# Patient Record
Sex: FEMALE | Race: WHITE | NOT HISPANIC OR LATINO
[De-identification: names, ages, dates, MRNs, and addresses within clinical notes are randomized per-mention and may not be internally consistent; named-entity substitution may affect disease eponyms.]

---

## 2019-12-31 ENCOUNTER — TRANSCRIPTION ENCOUNTER (OUTPATIENT)
Age: 84
End: 2019-12-31

## 2019-12-31 ENCOUNTER — INPATIENT (INPATIENT)
Facility: HOSPITAL | Age: 84
LOS: 2 days | Discharge: NURSING, REHAB OR LONG-TERM CARE | DRG: 481 | End: 2020-01-03
Attending: ORTHOPAEDIC SURGERY | Admitting: ORTHOPAEDIC SURGERY
Payer: MEDICARE

## 2019-12-31 VITALS
HEART RATE: 98 BPM | DIASTOLIC BLOOD PRESSURE: 74 MMHG | SYSTOLIC BLOOD PRESSURE: 164 MMHG | RESPIRATION RATE: 18 BRPM | OXYGEN SATURATION: 96 % | WEIGHT: 145.95 LBS | TEMPERATURE: 99 F

## 2019-12-31 DIAGNOSIS — Z98.49 CATARACT EXTRACTION STATUS, UNSPECIFIED EYE: Chronic | ICD-10-CM

## 2019-12-31 DIAGNOSIS — S72.001A FRACTURE OF UNSPECIFIED PART OF NECK OF RIGHT FEMUR, INITIAL ENCOUNTER FOR CLOSED FRACTURE: ICD-10-CM

## 2019-12-31 LAB
ALBUMIN SERPL ELPH-MCNC: 4.1 G/DL — SIGNIFICANT CHANGE UP (ref 3.5–5)
ALP SERPL-CCNC: 98 U/L — SIGNIFICANT CHANGE UP (ref 40–120)
ALT FLD-CCNC: 30 U/L DA — SIGNIFICANT CHANGE UP (ref 10–60)
ANION GAP SERPL CALC-SCNC: 7 MMOL/L — SIGNIFICANT CHANGE UP (ref 5–17)
APTT BLD: 28.4 SEC — SIGNIFICANT CHANGE UP (ref 27.5–36.3)
AST SERPL-CCNC: 34 U/L — SIGNIFICANT CHANGE UP (ref 10–40)
BASOPHILS # BLD AUTO: 0 K/UL — SIGNIFICANT CHANGE UP (ref 0–0.2)
BASOPHILS NFR BLD AUTO: 0 % — SIGNIFICANT CHANGE UP (ref 0–2)
BILIRUB SERPL-MCNC: 0.6 MG/DL — SIGNIFICANT CHANGE UP (ref 0.2–1.2)
BUN SERPL-MCNC: 29 MG/DL — HIGH (ref 7–18)
CALCIUM SERPL-MCNC: 8.8 MG/DL — SIGNIFICANT CHANGE UP (ref 8.4–10.5)
CHLORIDE SERPL-SCNC: 107 MMOL/L — SIGNIFICANT CHANGE UP (ref 96–108)
CO2 SERPL-SCNC: 25 MMOL/L — SIGNIFICANT CHANGE UP (ref 22–31)
CREAT SERPL-MCNC: 1.06 MG/DL — SIGNIFICANT CHANGE UP (ref 0.5–1.3)
EOSINOPHIL # BLD AUTO: 0 K/UL — SIGNIFICANT CHANGE UP (ref 0–0.5)
EOSINOPHIL NFR BLD AUTO: 0 % — SIGNIFICANT CHANGE UP (ref 0–6)
GLUCOSE SERPL-MCNC: 104 MG/DL — HIGH (ref 70–99)
HCT VFR BLD CALC: 36 % — SIGNIFICANT CHANGE UP (ref 34.5–45)
HGB BLD-MCNC: 11.3 G/DL — LOW (ref 11.5–15.5)
INR BLD: 1.03 RATIO — SIGNIFICANT CHANGE UP (ref 0.88–1.16)
LYMPHOCYTES # BLD AUTO: 1.06 K/UL — SIGNIFICANT CHANGE UP (ref 1–3.3)
LYMPHOCYTES # BLD AUTO: 8 % — LOW (ref 13–44)
MCHC RBC-ENTMCNC: 21.4 PG — LOW (ref 27–34)
MCHC RBC-ENTMCNC: 31.4 GM/DL — LOW (ref 32–36)
MCV RBC AUTO: 68.3 FL — LOW (ref 80–100)
MONOCYTES # BLD AUTO: 0.53 K/UL — SIGNIFICANT CHANGE UP (ref 0–0.9)
MONOCYTES NFR BLD AUTO: 4 % — SIGNIFICANT CHANGE UP (ref 2–14)
NEUTROPHILS # BLD AUTO: 11.62 K/UL — HIGH (ref 1.8–7.4)
NEUTROPHILS NFR BLD AUTO: 88 % — HIGH (ref 43–77)
PLATELET # BLD AUTO: 160 K/UL — SIGNIFICANT CHANGE UP (ref 150–400)
POTASSIUM SERPL-MCNC: 4.7 MMOL/L — SIGNIFICANT CHANGE UP (ref 3.5–5.3)
POTASSIUM SERPL-SCNC: 4.7 MMOL/L — SIGNIFICANT CHANGE UP (ref 3.5–5.3)
PROT SERPL-MCNC: 7.1 G/DL — SIGNIFICANT CHANGE UP (ref 6–8.3)
PROTHROM AB SERPL-ACNC: 11.5 SEC — SIGNIFICANT CHANGE UP (ref 10–12.9)
RBC # BLD: 5.27 M/UL — HIGH (ref 3.8–5.2)
RBC # FLD: 17.4 % — HIGH (ref 10.3–14.5)
SODIUM SERPL-SCNC: 139 MMOL/L — SIGNIFICANT CHANGE UP (ref 135–145)
WBC # BLD: 13.2 K/UL — HIGH (ref 3.8–10.5)
WBC # FLD AUTO: 13.2 K/UL — HIGH (ref 3.8–10.5)

## 2019-12-31 PROCEDURE — 99222 1ST HOSP IP/OBS MODERATE 55: CPT

## 2019-12-31 PROCEDURE — 73502 X-RAY EXAM HIP UNI 2-3 VIEWS: CPT | Mod: 26,RT

## 2019-12-31 PROCEDURE — 99285 EMERGENCY DEPT VISIT HI MDM: CPT

## 2019-12-31 PROCEDURE — 71045 X-RAY EXAM CHEST 1 VIEW: CPT | Mod: 26

## 2019-12-31 PROCEDURE — 73552 X-RAY EXAM OF FEMUR 2/>: CPT | Mod: 26,RT

## 2019-12-31 RX ORDER — MORPHINE SULFATE 50 MG/1
2 CAPSULE, EXTENDED RELEASE ORAL ONCE
Refills: 0 | Status: DISCONTINUED | OUTPATIENT
Start: 2019-12-31 | End: 2019-12-31

## 2019-12-31 RX ORDER — ACETAMINOPHEN 500 MG
1000 TABLET ORAL ONCE
Refills: 0 | Status: COMPLETED | OUTPATIENT
Start: 2019-12-31 | End: 2020-01-01

## 2019-12-31 RX ORDER — HYDROMORPHONE HYDROCHLORIDE 2 MG/ML
0.5 INJECTION INTRAMUSCULAR; INTRAVENOUS; SUBCUTANEOUS ONCE
Refills: 0 | Status: DISCONTINUED | OUTPATIENT
Start: 2019-12-31 | End: 2019-12-31

## 2019-12-31 RX ORDER — MORPHINE SULFATE 50 MG/1
2 CAPSULE, EXTENDED RELEASE ORAL EVERY 4 HOURS
Refills: 0 | Status: DISCONTINUED | OUTPATIENT
Start: 2019-12-31 | End: 2020-01-01

## 2019-12-31 RX ORDER — SODIUM CHLORIDE 9 MG/ML
1000 INJECTION INTRAMUSCULAR; INTRAVENOUS; SUBCUTANEOUS ONCE
Refills: 0 | Status: COMPLETED | OUTPATIENT
Start: 2019-12-31 | End: 2019-12-31

## 2019-12-31 RX ORDER — HEPARIN SODIUM 5000 [USP'U]/ML
5000 INJECTION INTRAVENOUS; SUBCUTANEOUS EVERY 8 HOURS
Refills: 0 | Status: DISCONTINUED | OUTPATIENT
Start: 2019-12-31 | End: 2020-01-01

## 2019-12-31 RX ORDER — SODIUM CHLORIDE 9 MG/ML
1000 INJECTION, SOLUTION INTRAVENOUS
Refills: 0 | Status: DISCONTINUED | OUTPATIENT
Start: 2019-12-31 | End: 2020-01-01

## 2019-12-31 RX ORDER — ONDANSETRON 8 MG/1
4 TABLET, FILM COATED ORAL ONCE
Refills: 0 | Status: COMPLETED | OUTPATIENT
Start: 2019-12-31 | End: 2019-12-31

## 2019-12-31 RX ADMIN — SODIUM CHLORIDE 1000 MILLILITER(S): 9 INJECTION INTRAMUSCULAR; INTRAVENOUS; SUBCUTANEOUS at 19:38

## 2019-12-31 RX ADMIN — HYDROMORPHONE HYDROCHLORIDE 0.5 MILLIGRAM(S): 2 INJECTION INTRAMUSCULAR; INTRAVENOUS; SUBCUTANEOUS at 19:37

## 2019-12-31 RX ADMIN — MORPHINE SULFATE 2 MILLIGRAM(S): 50 CAPSULE, EXTENDED RELEASE ORAL at 22:09

## 2019-12-31 RX ADMIN — HYDROMORPHONE HYDROCHLORIDE 0.5 MILLIGRAM(S): 2 INJECTION INTRAMUSCULAR; INTRAVENOUS; SUBCUTANEOUS at 19:38

## 2019-12-31 RX ADMIN — ONDANSETRON 4 MILLIGRAM(S): 8 TABLET, FILM COATED ORAL at 22:17

## 2019-12-31 RX ADMIN — MORPHINE SULFATE 2 MILLIGRAM(S): 50 CAPSULE, EXTENDED RELEASE ORAL at 22:53

## 2019-12-31 NOTE — H&P ADULT - NSHPLABSRESULTS_GEN_ALL_CORE
11.3   13.20 )-----------( 160      ( 31 Dec 2019 19:35 )             36.0   12-31    139  |  107  |  29<H>  ----------------------------<  104<H>  4.7   |  25  |  1.06    Ca    8.8      31 Dec 2019 19:35    TPro  7.1  /  Alb  4.1  /  TBili  0.6  /  DBili  x   /  AST  34  /  ALT  30  /  AlkPhos  98  12-31      Hip XR /Rt femur XR (prelim)  Intertochanteric fracture of the Rt Hip

## 2019-12-31 NOTE — H&P ADULT - NSICDXPASTMEDICALHX_GEN_ALL_CORE_FT
PAST MEDICAL HISTORY:  CAD S/P percutaneous coronary angioplasty     H/O Raynaud's syndrome     HTN (hypertension)     Rheumatoid arthritis

## 2019-12-31 NOTE — H&P ADULT - HISTORY OF PRESENT ILLNESS
88 y.o female with PMHX of HTN, CAD (PCI x1 stent, no blood thinners) , RA and Raynaud's disease and PSHx of Cataract surgery  presents to the ED with a Rt hip pain s/p mechanical fall today. Pt was standing on a stool in her house,fell of and landed on R hip and unable to walk. Patient denies any head injury, LOC, use of blood thinners, neck pain, chest pain , nausea, vomiting, weakness or paralysis or any other acute complaints.

## 2019-12-31 NOTE — CONSULT NOTE ADULT - ASSESSMENT
88 y.o female with PMHX of HTN, CAD (PCI x1 stent, no blood thinners) , RA and Raynaud's disease and PSHx of Cataract surgery  presents to the ED with a Rt hip pain s/p mechanical fall today. Medicine is consulted for preoperative clearance.    Pre operative clearance for Hip surgery:  Patient is fully independent in activities of daily living with METS >4, She has no personal or family hx of anesthesia complication, is non smoker and non ETOH abuse. No symptoms of cardiac decompensation. Her EKG shows NSR, No ST-T wave changes.   She is at low to moderate risk for getting surgery.    Hypoxia:  Patient was hypoxic to 85% in ED on room air. Patient was given 2LNC and O2 sat increase t0 89 -91%. Seems like artifact as patient is not in distress. F.u cardiac enzymes.     Discussed with attending.

## 2019-12-31 NOTE — H&P ADULT - ASSESSMENT
88 y.o female with PMHX of HTN, CAD (PCI x1 stent, no blood thinners) , RA and Raynaud's disease and PSHx of Cataract surgery  presents to the ED with a Rt hip pain s/p mechanical fall today Patient denies any head injury, LOC, use of blood thinners. Pt has a possible Rt intertrochanteric fracture of Hip . Admitted to Ortho for possible Surgical intervention    Admit to [facility] under Dr Gunter for possible Orthopedic Surgical intervention  Keep Preop mode  NPO  IVF on NPO  Pain control  DVT PPx

## 2019-12-31 NOTE — H&P ADULT - NSHPPHYSICALEXAM_GEN_ALL_CORE
T(C): 36.6 (12-31-19 @ 19:24), Max: 37.2 (12-31-19 @ 18:34)  HR: 89 (12-31-19 @ 19:24) (89 - 98)  BP: 167/69 (12-31-19 @ 19:24) (164/74 - 167/69)  RR: 18 (12-31-19 @ 19:24) (18 - 18)  SpO2: 92% (12-31-19 @ 19:24) (92% - 96%)  Wt(kg): --Vital Signs Last 24 Hrs  T(C): 36.6 (31 Dec 2019 19:24), Max: 37.2 (31 Dec 2019 18:34)  T(F): 97.9 (31 Dec 2019 19:24), Max: 98.9 (31 Dec 2019 18:34)  HR: 89 (31 Dec 2019 19:24) (89 - 98)  BP: 167/69 (31 Dec 2019 19:24) (164/74 - 167/69)  BP(mean): --  RR: 18 (31 Dec 2019 19:24) (18 - 18)  SpO2: 92% (31 Dec 2019 19:24) (92% - 96%)    PHYSICAL EXAM:  GENERAL: elderly female in NAD, well-groomed, well-developed  HEAD:  Atraumatic, Normocephalic  NECK: Supple, No JVD, Normal thyroid  CHEST/LUNG: Clear to percussion bilaterally; No rales, rhonchi, wheezing, or rubs  HEART: Regular rate and rhythm; No murmurs, rubs, or gallops  ABDOMEN: Soft, Nontender, Nondistended; Bowel sounds present  EXTREMITIES:  Rt hip tenderness, PROM, no ecchymoses or open wound, DP/PT pulses are palpable No clubbing, cyanosis, or edema  SKIN: No rashes or lesions or open wound noted

## 2019-12-31 NOTE — CONSULT NOTE ADULT - ATTENDING COMMENTS
Vital Signs Last 24 Hrs  T(C): 36.6 (31 Dec 2019 19:24), Max: 37.2 (31 Dec 2019 18:34)  T(F): 97.9 (31 Dec 2019 19:24), Max: 98.9 (31 Dec 2019 18:34)  HR: 89 (31 Dec 2019 19:24) (89 - 98)  BP: 167/69 (31 Dec 2019 19:24) (164/74 - 167/69)  BP(mean): --  RR: 18 (31 Dec 2019 19:24) (18 - 18)  SpO2: 92% (31 Dec 2019 19:24) (92% - 96%) Pt seen and examined. Case discussed with Housestaff.  Consult for pre-op evaluation and optimization.  88 year old woman with hx of HTN, CAD s/p PCI ( 1 stent) in 2015 @ [facility]- both well controlled on medications with regular follow up with her physicians. She is admitted to the Orthopedic service on account of mechanical fall on her right hip with a right hip fracture.  She is very active - independent with her ADLS; able to grocery shop and cook. Keeps a garden and walks multiple blocks with no limitations.   No recent chest pain; NO hx of DM2 /CVA / CKD or CHF.    Vital Signs Last 24 Hrs  T(C): 36.6 (31 Dec 2019 19:24), Max: 37.2 (31 Dec 2019 18:34)  T(F): 97.9 (31 Dec 2019 19:24), Max: 98.9 (31 Dec 2019 18:34)  HR: 89 (31 Dec 2019 19:24) (89 - 98)  BP: 167/69 (31 Dec 2019 19:24) (164/74 - 167/69)  RR: 18 (31 Dec 2019 19:24) (18 - 18)  SpO2: 92% (31 Dec 2019 19:24) (92% - 96%)    Elderly woman, in painful distress, AAO X 3  CTA B/L; RRR S1S2 only  Soft NT ND BS +  Right LE is shortened and externally rotated.  No pedal edema  No focal deficits    Labs                         11.3   13.20 )-----------( 160      ( 31 Dec 2019 19:35 )             36.0     12-31    139  |  107  |  29<H>  ----------------------------<  104<H>  4.7   |  25  |  1.06    Ca    8.8      31 Dec 2019 19:35    TPro  7.1  /  Alb  4.1  /  TBili  0.6  /  DBili  x   /  AST  34  /  ALT  30  /  AlkPhos  98  12-31    Pelvic x ray / R femur X ray - slightly displaced impacted intertrochanteric fracture  CXR - no acute findings    Impression  88 year old woman with hx as above here post mechanical fall with right hip fracture as detailed above.  Pt with no active CAD - off Plavix recently, HTN well controlled with METS approximated above 4.  She has a low to medium risk for a cardiac event while undergoing this intermediate risk no cardiac procedure.  No further evaluation required.  Continue BP meds  Hold ASA  Pain control per primary  Anticoagulation per primary

## 2019-12-31 NOTE — H&P ADULT - ATTENDING COMMENTS
pt seen and evaluated.  displaced r it fx with subtroch extention.  rec IM nailing of r IT / subtroch fx.  will need medical clearance / optimization.  explained risks / benefits alternativest to pt and family, including but not limited to heart attack stroke, death, failure of fixation, fracture, blood clot, nerve damage, bleeding, infection , and leg lngth discrepancy.   pt and family understand and wish to proceed.

## 2019-12-31 NOTE — ED ADULT NURSE NOTE - NSIMPLEMENTINTERV_GEN_ALL_ED
Implemented All Fall with Harm Risk Interventions:  [place] to call system. Call bell, personal items and telephone within reach. Instruct patient to call for assistance. Room bathroom lighting operational. Non-slip footwear when patient is off stretcher. Physically safe environment: no spills, clutter or unnecessary equipment. Stretcher in lowest position, wheels locked, appropriate side rails in place. Provide visual cue, wrist band, yellow gown, etc. Monitor gait and stability. Monitor for mental status changes and reorient to person, place, and time. Review medications for side effects contributing to fall risk. Reinforce activity limits and safety measures with patient and family. Provide visual clues: red socks.

## 2019-12-31 NOTE — ED ADULT TRIAGE NOTE - CHIEF COMPLAINT QUOTE
as per the  ems pt fell hit back  patient c/o rt hip pain as per the  ems pt fell hit back  patient c/o rt hip pain . not on blood thinners

## 2020-01-01 LAB
ABO RH CONFIRMATION: SIGNIFICANT CHANGE UP
ANION GAP SERPL CALC-SCNC: 5 MMOL/L — SIGNIFICANT CHANGE UP (ref 5–17)
BASOPHILS # BLD AUTO: 0.02 K/UL — SIGNIFICANT CHANGE UP (ref 0–0.2)
BASOPHILS NFR BLD AUTO: 0.2 % — SIGNIFICANT CHANGE UP (ref 0–2)
BUN SERPL-MCNC: 25 MG/DL — HIGH (ref 7–18)
CALCIUM SERPL-MCNC: 8.2 MG/DL — LOW (ref 8.4–10.5)
CHLORIDE SERPL-SCNC: 108 MMOL/L — SIGNIFICANT CHANGE UP (ref 96–108)
CO2 SERPL-SCNC: 27 MMOL/L — SIGNIFICANT CHANGE UP (ref 22–31)
CREAT SERPL-MCNC: 0.74 MG/DL — SIGNIFICANT CHANGE UP (ref 0.5–1.3)
EOSINOPHIL # BLD AUTO: 0.01 K/UL — SIGNIFICANT CHANGE UP (ref 0–0.5)
EOSINOPHIL NFR BLD AUTO: 0.1 % — SIGNIFICANT CHANGE UP (ref 0–6)
GLUCOSE SERPL-MCNC: 118 MG/DL — HIGH (ref 70–99)
HCT VFR BLD CALC: 33.2 % — LOW (ref 34.5–45)
HGB BLD-MCNC: 10.1 G/DL — LOW (ref 11.5–15.5)
IMM GRANULOCYTES NFR BLD AUTO: 0.7 % — SIGNIFICANT CHANGE UP (ref 0–1.5)
LYMPHOCYTES # BLD AUTO: 1.08 K/UL — SIGNIFICANT CHANGE UP (ref 1–3.3)
LYMPHOCYTES # BLD AUTO: 10.7 % — LOW (ref 13–44)
MCHC RBC-ENTMCNC: 20.7 PG — LOW (ref 27–34)
MCHC RBC-ENTMCNC: 30.4 GM/DL — LOW (ref 32–36)
MCV RBC AUTO: 68.2 FL — LOW (ref 80–100)
MONOCYTES # BLD AUTO: 0.63 K/UL — SIGNIFICANT CHANGE UP (ref 0–0.9)
MONOCYTES NFR BLD AUTO: 6.3 % — SIGNIFICANT CHANGE UP (ref 2–14)
NEUTROPHILS # BLD AUTO: 8.26 K/UL — HIGH (ref 1.8–7.4)
NEUTROPHILS NFR BLD AUTO: 82 % — HIGH (ref 43–77)
NRBC # BLD: 0 /100 WBCS — SIGNIFICANT CHANGE UP (ref 0–0)
PLATELET # BLD AUTO: 140 K/UL — LOW (ref 150–400)
POTASSIUM SERPL-MCNC: 4.7 MMOL/L — SIGNIFICANT CHANGE UP (ref 3.5–5.3)
POTASSIUM SERPL-SCNC: 4.7 MMOL/L — SIGNIFICANT CHANGE UP (ref 3.5–5.3)
RBC # BLD: 4.87 M/UL — SIGNIFICANT CHANGE UP (ref 3.8–5.2)
RBC # FLD: 16.6 % — HIGH (ref 10.3–14.5)
SODIUM SERPL-SCNC: 140 MMOL/L — SIGNIFICANT CHANGE UP (ref 135–145)
WBC # BLD: 10.07 K/UL — SIGNIFICANT CHANGE UP (ref 3.8–10.5)
WBC # FLD AUTO: 10.07 K/UL — SIGNIFICANT CHANGE UP (ref 3.8–10.5)

## 2020-01-01 PROCEDURE — 77071 MNL APPL STRS JT RADIOGRAPHY: CPT | Mod: 26,RT

## 2020-01-01 PROCEDURE — 27245 TREAT THIGH FRACTURE: CPT | Mod: AS,RT,59

## 2020-01-01 PROCEDURE — 27170 REPAIR/GRAFT FEMUR HEAD/NECK: CPT | Mod: AS,59,RT

## 2020-01-01 PROCEDURE — 76000 FLUOROSCOPY <1 HR PHYS/QHP: CPT | Mod: 26

## 2020-01-01 RX ORDER — CHLORHEXIDINE GLUCONATE 213 G/1000ML
1 SOLUTION TOPICAL EVERY 12 HOURS
Refills: 0 | Status: DISCONTINUED | OUTPATIENT
Start: 2020-01-01 | End: 2020-01-01

## 2020-01-01 RX ORDER — ENOXAPARIN SODIUM 100 MG/ML
30 INJECTION SUBCUTANEOUS EVERY 12 HOURS
Refills: 0 | Status: DISCONTINUED | OUTPATIENT
Start: 2020-01-02 | End: 2020-01-03

## 2020-01-01 RX ORDER — HYDROMORPHONE HYDROCHLORIDE 2 MG/ML
1 INJECTION INTRAMUSCULAR; INTRAVENOUS; SUBCUTANEOUS
Refills: 0 | Status: DISCONTINUED | OUTPATIENT
Start: 2020-01-01 | End: 2020-01-01

## 2020-01-01 RX ORDER — ONDANSETRON 8 MG/1
4 TABLET, FILM COATED ORAL EVERY 6 HOURS
Refills: 0 | Status: DISCONTINUED | OUTPATIENT
Start: 2020-01-01 | End: 2020-01-03

## 2020-01-01 RX ORDER — SODIUM CHLORIDE 9 MG/ML
1000 INJECTION INTRAMUSCULAR; INTRAVENOUS; SUBCUTANEOUS
Refills: 0 | Status: DISCONTINUED | OUTPATIENT
Start: 2020-01-01 | End: 2020-01-03

## 2020-01-01 RX ORDER — MAGNESIUM HYDROXIDE 400 MG/1
30 TABLET, CHEWABLE ORAL DAILY
Refills: 0 | Status: DISCONTINUED | OUTPATIENT
Start: 2020-01-01 | End: 2020-01-03

## 2020-01-01 RX ORDER — OXYCODONE HYDROCHLORIDE 5 MG/1
2.5 TABLET ORAL EVERY 4 HOURS
Refills: 0 | Status: DISCONTINUED | OUTPATIENT
Start: 2020-01-01 | End: 2020-01-03

## 2020-01-01 RX ORDER — HYDROMORPHONE HYDROCHLORIDE 2 MG/ML
0.5 INJECTION INTRAMUSCULAR; INTRAVENOUS; SUBCUTANEOUS
Refills: 0 | Status: DISCONTINUED | OUTPATIENT
Start: 2020-01-01 | End: 2020-01-01

## 2020-01-01 RX ORDER — SENNA PLUS 8.6 MG/1
2 TABLET ORAL AT BEDTIME
Refills: 0 | Status: DISCONTINUED | OUTPATIENT
Start: 2020-01-01 | End: 2020-01-03

## 2020-01-01 RX ORDER — POLYETHYLENE GLYCOL 3350 17 G/17G
17 POWDER, FOR SOLUTION ORAL DAILY
Refills: 0 | Status: DISCONTINUED | OUTPATIENT
Start: 2020-01-01 | End: 2020-01-03

## 2020-01-01 RX ORDER — ACETAMINOPHEN 500 MG
1000 TABLET ORAL ONCE
Refills: 0 | Status: COMPLETED | OUTPATIENT
Start: 2020-01-01 | End: 2020-01-01

## 2020-01-01 RX ORDER — ACETAMINOPHEN 500 MG
975 TABLET ORAL EVERY 8 HOURS
Refills: 0 | Status: DISCONTINUED | OUTPATIENT
Start: 2020-01-01 | End: 2020-01-02

## 2020-01-01 RX ADMIN — Medication 1000 MILLIGRAM(S): at 02:51

## 2020-01-01 RX ADMIN — Medication 100 MILLIGRAM(S): at 23:35

## 2020-01-01 RX ADMIN — MORPHINE SULFATE 2 MILLIGRAM(S): 50 CAPSULE, EXTENDED RELEASE ORAL at 10:30

## 2020-01-01 RX ADMIN — SENNA PLUS 2 TABLET(S): 8.6 TABLET ORAL at 23:34

## 2020-01-01 RX ADMIN — Medication 400 MILLIGRAM(S): at 13:24

## 2020-01-01 RX ADMIN — Medication 400 MILLIGRAM(S): at 02:21

## 2020-01-01 RX ADMIN — MORPHINE SULFATE 2 MILLIGRAM(S): 50 CAPSULE, EXTENDED RELEASE ORAL at 10:01

## 2020-01-01 RX ADMIN — CHLORHEXIDINE GLUCONATE 1 APPLICATION(S): 213 SOLUTION TOPICAL at 13:25

## 2020-01-01 RX ADMIN — SODIUM CHLORIDE 100 MILLILITER(S): 9 INJECTION, SOLUTION INTRAVENOUS at 06:05

## 2020-01-01 RX ADMIN — MORPHINE SULFATE 2 MILLIGRAM(S): 50 CAPSULE, EXTENDED RELEASE ORAL at 06:30

## 2020-01-01 RX ADMIN — OXYCODONE HYDROCHLORIDE 2.5 MILLIGRAM(S): 5 TABLET ORAL at 23:34

## 2020-01-01 RX ADMIN — MORPHINE SULFATE 2 MILLIGRAM(S): 50 CAPSULE, EXTENDED RELEASE ORAL at 06:00

## 2020-01-01 RX ADMIN — Medication 1000 MILLIGRAM(S): at 13:54

## 2020-01-01 NOTE — PROGRESS NOTE ADULT - SUBJECTIVE AND OBJECTIVE BOX
Orthopedics:    dx: Rt hip intertrochanteric fracture    pt seen and evaluated at bedside  accompanied, with her family at bedside  s/p fall at home, missed step and sustained Rt hip fx  Pt denies Chest pain, SOB, dyspnea, paresthesias, N/V/D, abdominal pain, syncope, or pain anywhere else.     Vital Signs Last 24 Hrs  T(C): 36.8 (01 Jan 2020 06:08), Max: 37.2 (31 Dec 2019 18:34)  T(F): 98.3 (01 Jan 2020 06:08), Max: 98.9 (31 Dec 2019 18:34)  HR: 75 (01 Jan 2020 06:08) (75 - 98)  BP: 125/64 (01 Jan 2020 06:08) (125/64 - 167/69)  BP(mean): --  RR: 18 (01 Jan 2020 06:08) (18 - 18)  SpO2: 96% (01 Jan 2020 06:08) (91% - 96%)    PE:   Rt hip skin pink and warm  +ecchymoses noted over the lateral aspect of rt hip  shortened and ext rotated RLE  no ct calves soft  compartments soft  ankles/toes flex/extend   nvi silt                          10.1   10.07 )-----------( 140      ( 01 Jan 2020 06:28 )             33.2   01-01    140  |  108  |  25<H>  ----------------------------<  118<H>  4.7   |  27  |  0.74    Ca    8.2<L>      01 Jan 2020 06:28    TPro  7.1  /  Alb  4.1  /  TBili  0.6  /  DBili  x   /  AST  34  /  ALT  30  /  AlkPhos  98  12-31    radiology:  < from: Xray Femur 2 Views, Right (12.31.19 @ 20:33) >    EXAM:  XR FEMUR 2 VIEWS RT                          EXAM:  XR HIP WITH PELV 2-3V RT                            PROCEDURE DATE:  12/31/2019          INTERPRETATION:  Radiographs of the right femur and right hip       CLINICAL INFORMATION:  Trauma     TECHNIQUE:   Frontal and extension views of the hip were obtained. AP view of the pelvis is performed. AP and lateral views of the femur are performed.    FINDINGS:   No prior exams are available for comparison.    The right hip is remarkable for displaced intertrochanteric fracture with fracture line extending inferiorly below the level of the lesser trochanter. Contralateral hip is intact. The femoral shaft more distally and the knee do not demonstrate fracture. Degenerative changes of the knee are appreciated. There are degenerative changes of the visualized lumbar spine. Vascular calcifications are noted.    No soft tissue abnormality is recognized.    IMPRESSION:    Displaced intratrochanteric fracture of the right hip. Fracture extendsinferior to the lesser trochanter. Remainder of the femoral shaft and distal femur do not demonstrate additional fractures                     ZULEIMA WELDON M.D.,ATTENDING RADIOLOGIST  This document has been electronically signed. Jan 1 2020  9:43AM                < end of copied text >    Assessment: 89yo F with Rt hip IT fx  Plan:  - Recommendation:     > Surgery pending family/patient consent     > NPO with IVF  - Planned Procedure: Rt HIP IM NAILING   - Surgeon: Dr Preston Gunter  - Medical optimization: Cleared as intermediate risk by Hospitalist  - Dvt ppx with venodynes b/l  - Hold Heparin SQ today  - CHG/povidone nasal swabs  - case d/w dr gunter

## 2020-01-01 NOTE — BRIEF OPERATIVE NOTE - NSICDXBRIEFPREOP_GEN_ALL_CORE_FT
PRE-OP DIAGNOSIS:  Closed intertrochanteric fracture of right femur 01-Jan-2020 17:27:13  Kamlesh Rowland

## 2020-01-01 NOTE — PROGRESS NOTE ADULT - SUBJECTIVE AND OBJECTIVE BOX
TI CARRION  [id number]  88y    Orthopedic PACU Note    Dx: S/p rt hip nailing pod#0    Pt tolerated procedure well.  No acute events.    Denies cp/sob/dyspnea    T(C): 37.1 (01-01-20 @ 14:57), Max: 37.2 (12-31-19 @ 18:34)  HR: 78 (01-01-20 @ 14:57) (75 - 98)  BP: 121/50 (01-01-20 @ 14:57) (114/45 - 167/69)  RR: 19 (01-01-20 @ 14:57) (18 - 19)  SpO2: 97% (01-01-20 @ 14:57) (91% - 97%)    PE:   rt hip dressing c/d/i  rle in normal alignment  2+pulses  nvi silt  ankles flex/extend  compartments soft      Labs:  cbc/bmp ordered    Impression:   Plan:  - Admit to: [facility]- ortho  - Condition: Stable  - Pain control  - dvt ppx  - daily pt: WBAT of RLE with walker  - post-op abx x24hrs (clinda 600mg x2 doses over the next 24hours)  - incentive spirometry  - Transfer to: Surgical floor  - Case dw dr becker

## 2020-01-02 DIAGNOSIS — S72.001A FRACTURE OF UNSPECIFIED PART OF NECK OF RIGHT FEMUR, INITIAL ENCOUNTER FOR CLOSED FRACTURE: ICD-10-CM

## 2020-01-02 DIAGNOSIS — M25.551 PAIN IN RIGHT HIP: ICD-10-CM

## 2020-01-02 LAB
ANION GAP SERPL CALC-SCNC: 6 MMOL/L — SIGNIFICANT CHANGE UP (ref 5–17)
BASOPHILS # BLD AUTO: 0.03 K/UL — SIGNIFICANT CHANGE UP (ref 0–0.2)
BASOPHILS NFR BLD AUTO: 0.3 % — SIGNIFICANT CHANGE UP (ref 0–2)
BUN SERPL-MCNC: 13 MG/DL — SIGNIFICANT CHANGE UP (ref 7–18)
CALCIUM SERPL-MCNC: 8 MG/DL — LOW (ref 8.4–10.5)
CHLORIDE SERPL-SCNC: 104 MMOL/L — SIGNIFICANT CHANGE UP (ref 96–108)
CO2 SERPL-SCNC: 27 MMOL/L — SIGNIFICANT CHANGE UP (ref 22–31)
CREAT SERPL-MCNC: 0.62 MG/DL — SIGNIFICANT CHANGE UP (ref 0.5–1.3)
EOSINOPHIL # BLD AUTO: 0.21 K/UL — SIGNIFICANT CHANGE UP (ref 0–0.5)
EOSINOPHIL NFR BLD AUTO: 2.3 % — SIGNIFICANT CHANGE UP (ref 0–6)
GLUCOSE SERPL-MCNC: 134 MG/DL — HIGH (ref 70–99)
HCT VFR BLD CALC: 27.6 % — LOW (ref 34.5–45)
HCT VFR BLD CALC: 28.6 % — LOW (ref 34.5–45)
HGB BLD-MCNC: 8.5 G/DL — LOW (ref 11.5–15.5)
HGB BLD-MCNC: 8.7 G/DL — LOW (ref 11.5–15.5)
IMM GRANULOCYTES NFR BLD AUTO: 0.5 % — SIGNIFICANT CHANGE UP (ref 0–1.5)
LYMPHOCYTES # BLD AUTO: 1.16 K/UL — SIGNIFICANT CHANGE UP (ref 1–3.3)
LYMPHOCYTES # BLD AUTO: 12.5 % — LOW (ref 13–44)
MCHC RBC-ENTMCNC: 21.2 PG — LOW (ref 27–34)
MCHC RBC-ENTMCNC: 21.4 PG — LOW (ref 27–34)
MCHC RBC-ENTMCNC: 30.4 GM/DL — LOW (ref 32–36)
MCHC RBC-ENTMCNC: 30.8 GM/DL — LOW (ref 32–36)
MCV RBC AUTO: 69.3 FL — LOW (ref 80–100)
MCV RBC AUTO: 69.6 FL — LOW (ref 80–100)
MONOCYTES # BLD AUTO: 0.56 K/UL — SIGNIFICANT CHANGE UP (ref 0–0.9)
MONOCYTES NFR BLD AUTO: 6 % — SIGNIFICANT CHANGE UP (ref 2–14)
NEUTROPHILS # BLD AUTO: 7.26 K/UL — SIGNIFICANT CHANGE UP (ref 1.8–7.4)
NEUTROPHILS NFR BLD AUTO: 78.4 % — HIGH (ref 43–77)
NRBC # BLD: 0 /100 WBCS — SIGNIFICANT CHANGE UP (ref 0–0)
NRBC # BLD: 0 /100 WBCS — SIGNIFICANT CHANGE UP (ref 0–0)
PLATELET # BLD AUTO: 116 K/UL — LOW (ref 150–400)
PLATELET # BLD AUTO: 121 K/UL — LOW (ref 150–400)
POTASSIUM SERPL-MCNC: 4.1 MMOL/L — SIGNIFICANT CHANGE UP (ref 3.5–5.3)
POTASSIUM SERPL-SCNC: 4.1 MMOL/L — SIGNIFICANT CHANGE UP (ref 3.5–5.3)
RBC # BLD: 3.98 M/UL — SIGNIFICANT CHANGE UP (ref 3.8–5.2)
RBC # BLD: 4.11 M/UL — SIGNIFICANT CHANGE UP (ref 3.8–5.2)
RBC # FLD: 16.4 % — HIGH (ref 10.3–14.5)
RBC # FLD: 16.4 % — HIGH (ref 10.3–14.5)
SODIUM SERPL-SCNC: 137 MMOL/L — SIGNIFICANT CHANGE UP (ref 135–145)
WBC # BLD: 10.27 K/UL — SIGNIFICANT CHANGE UP (ref 3.8–10.5)
WBC # BLD: 9.27 K/UL — SIGNIFICANT CHANGE UP (ref 3.8–10.5)
WBC # FLD AUTO: 10.27 K/UL — SIGNIFICANT CHANGE UP (ref 3.8–10.5)
WBC # FLD AUTO: 9.27 K/UL — SIGNIFICANT CHANGE UP (ref 3.8–10.5)

## 2020-01-02 PROCEDURE — 99233 SBSQ HOSP IP/OBS HIGH 50: CPT | Mod: GC

## 2020-01-02 RX ORDER — LOSARTAN POTASSIUM 100 MG/1
100 TABLET, FILM COATED ORAL DAILY
Refills: 0 | Status: DISCONTINUED | OUTPATIENT
Start: 2020-01-02 | End: 2020-01-03

## 2020-01-02 RX ORDER — ACETAMINOPHEN 500 MG
1000 TABLET ORAL ONCE
Refills: 0 | Status: COMPLETED | OUTPATIENT
Start: 2020-01-03 | End: 2020-01-03

## 2020-01-02 RX ORDER — ASPIRIN/CALCIUM CARB/MAGNESIUM 324 MG
1 TABLET ORAL
Qty: 0 | Refills: 0 | DISCHARGE

## 2020-01-02 RX ORDER — ACETAMINOPHEN 500 MG
1000 TABLET ORAL ONCE
Refills: 0 | Status: COMPLETED | OUTPATIENT
Start: 2020-01-02 | End: 2020-01-02

## 2020-01-02 RX ORDER — ASCORBIC ACID 60 MG
500 TABLET,CHEWABLE ORAL DAILY
Refills: 0 | Status: DISCONTINUED | OUTPATIENT
Start: 2020-01-02 | End: 2020-01-03

## 2020-01-02 RX ORDER — KETOROLAC TROMETHAMINE 30 MG/ML
15 SYRINGE (ML) INJECTION ONCE
Refills: 0 | Status: DISCONTINUED | OUTPATIENT
Start: 2020-01-02 | End: 2020-01-02

## 2020-01-02 RX ORDER — ATORVASTATIN CALCIUM 80 MG/1
20 TABLET, FILM COATED ORAL AT BEDTIME
Refills: 0 | Status: DISCONTINUED | OUTPATIENT
Start: 2020-01-02 | End: 2020-01-03

## 2020-01-02 RX ORDER — LATANOPROST 0.05 MG/ML
1 SOLUTION/ DROPS OPHTHALMIC; TOPICAL
Qty: 0 | Refills: 0 | DISCHARGE

## 2020-01-02 RX ORDER — SODIUM CHLORIDE 9 MG/ML
500 INJECTION INTRAMUSCULAR; INTRAVENOUS; SUBCUTANEOUS ONCE
Refills: 0 | Status: COMPLETED | OUTPATIENT
Start: 2020-01-02 | End: 2020-01-02

## 2020-01-02 RX ORDER — KETOROLAC TROMETHAMINE 30 MG/ML
15 SYRINGE (ML) INJECTION EVERY 6 HOURS
Refills: 0 | Status: DISCONTINUED | OUTPATIENT
Start: 2020-01-02 | End: 2020-01-03

## 2020-01-02 RX ORDER — LATANOPROST 0.05 MG/ML
1 SOLUTION/ DROPS OPHTHALMIC; TOPICAL AT BEDTIME
Refills: 0 | Status: DISCONTINUED | OUTPATIENT
Start: 2020-01-02 | End: 2020-01-03

## 2020-01-02 RX ORDER — ASPIRIN/CALCIUM CARB/MAGNESIUM 324 MG
81 TABLET ORAL DAILY
Refills: 0 | Status: DISCONTINUED | OUTPATIENT
Start: 2020-01-02 | End: 2020-01-03

## 2020-01-02 RX ORDER — ATORVASTATIN CALCIUM 80 MG/1
1 TABLET, FILM COATED ORAL
Qty: 0 | Refills: 0 | DISCHARGE

## 2020-01-02 RX ORDER — FERROUS SULFATE 325(65) MG
325 TABLET ORAL DAILY
Refills: 0 | Status: DISCONTINUED | OUTPATIENT
Start: 2020-01-02 | End: 2020-01-03

## 2020-01-02 RX ADMIN — ENOXAPARIN SODIUM 30 MILLIGRAM(S): 100 INJECTION SUBCUTANEOUS at 17:51

## 2020-01-02 RX ADMIN — Medication 100 MILLIGRAM(S): at 07:07

## 2020-01-02 RX ADMIN — OXYCODONE HYDROCHLORIDE 2.5 MILLIGRAM(S): 5 TABLET ORAL at 07:44

## 2020-01-02 RX ADMIN — OXYCODONE HYDROCHLORIDE 2.5 MILLIGRAM(S): 5 TABLET ORAL at 04:14

## 2020-01-02 RX ADMIN — SODIUM CHLORIDE 500 MILLILITER(S): 9 INJECTION INTRAMUSCULAR; INTRAVENOUS; SUBCUTANEOUS at 14:44

## 2020-01-02 RX ADMIN — OXYCODONE HYDROCHLORIDE 2.5 MILLIGRAM(S): 5 TABLET ORAL at 03:44

## 2020-01-02 RX ADMIN — ATORVASTATIN CALCIUM 20 MILLIGRAM(S): 80 TABLET, FILM COATED ORAL at 22:37

## 2020-01-02 RX ADMIN — Medication 325 MILLIGRAM(S): at 13:53

## 2020-01-02 RX ADMIN — Medication 15 MILLIGRAM(S): at 10:42

## 2020-01-02 RX ADMIN — Medication 15 MILLIGRAM(S): at 10:27

## 2020-01-02 RX ADMIN — ENOXAPARIN SODIUM 30 MILLIGRAM(S): 100 INJECTION SUBCUTANEOUS at 06:22

## 2020-01-02 RX ADMIN — POLYETHYLENE GLYCOL 3350 17 GRAM(S): 17 POWDER, FOR SOLUTION ORAL at 13:52

## 2020-01-02 RX ADMIN — LATANOPROST 1 DROP(S): 0.05 SOLUTION/ DROPS OPHTHALMIC; TOPICAL at 22:37

## 2020-01-02 RX ADMIN — OXYCODONE HYDROCHLORIDE 2.5 MILLIGRAM(S): 5 TABLET ORAL at 07:14

## 2020-01-02 RX ADMIN — Medication 1000 MILLIGRAM(S): at 14:05

## 2020-01-02 RX ADMIN — OXYCODONE HYDROCHLORIDE 2.5 MILLIGRAM(S): 5 TABLET ORAL at 00:04

## 2020-01-02 RX ADMIN — Medication 81 MILLIGRAM(S): at 13:51

## 2020-01-02 RX ADMIN — Medication 400 MILLIGRAM(S): at 13:50

## 2020-01-02 RX ADMIN — ONDANSETRON 4 MILLIGRAM(S): 8 TABLET, FILM COATED ORAL at 08:08

## 2020-01-02 RX ADMIN — Medication 500 MILLIGRAM(S): at 13:49

## 2020-01-02 NOTE — PHYSICAL THERAPY INITIAL EVALUATION ADULT - GAIT DEVIATIONS NOTED, PT EVAL
increased time in double stance/decreased shobha/decreased step length/decreased stride length/decreased weight-shifting ability

## 2020-01-02 NOTE — PROGRESS NOTE ADULT - SUBJECTIVE AND OBJECTIVE BOX
PGY3 Note discussed with supervising resident and primary attending.    Patient is a 88y old  Female who presents with a chief complaint of Rt Hip Fracture (02 Jan 2020 10:12)      INTERVAL HPI/OVERNIGHT EVENTS: Pt was seen and examined at bedside. POD1, Hb dropped     MEDICATIONS  (STANDING):  acetaminophen  IVPB .. 1000 milliGRAM(s) IV Intermittent once  acetaminophen  IVPB .. 1000 milliGRAM(s) IV Intermittent once  aspirin enteric coated 81 milliGRAM(s) Oral daily  atorvastatin 20 milliGRAM(s) Oral at bedtime  enoxaparin Injectable 30 milliGRAM(s) SubCutaneous every 12 hours  latanoprost 0.005% Ophthalmic Solution 1 Drop(s) Both EYES at bedtime  losartan 100 milliGRAM(s) Oral daily  polyethylene glycol 3350 17 Gram(s) Oral daily  senna 2 Tablet(s) Oral at bedtime  sodium chloride 0.9%. 1000 milliLiter(s) (110 mL/Hr) IV Continuous <Continuous>    MEDICATIONS  (PRN):  magnesium hydroxide Suspension 30 milliLiter(s) Oral daily PRN Constipation  ondansetron Injectable 4 milliGRAM(s) IV Push every 6 hours PRN Nausea and/or Vomiting  oxyCODONE    IR 2.5 milliGRAM(s) Oral every 4 hours PRN Severe Pain (7 - 10)      Allergies    penicillin (Unknown)    Intolerances        REVIEW OF SYSTEMS:  CONSTITUTIONAL: No fever, weight loss, or fatigue  RESPIRATORY: No cough, wheezing, chills or hemoptysis; No shortness of breath  CARDIOVASCULAR: No chest pain, palpitations, dizziness, or leg swelling  GASTROINTESTINAL: No abdominal or epigastric pain. No nausea, vomiting, or hematemesis; No diarrhea or constipation. No melena or hematochezia.  NEUROLOGICAL: No headaches, memory loss, loss of strength, numbness, or tremors  SKIN: No itching, burning, rashes, or lesions     Vital Signs Last 24 Hrs  T(C): 37.7 (02 Jan 2020 06:17), Max: 37.7 (02 Jan 2020 06:17)  T(F): 99.8 (02 Jan 2020 06:17), Max: 99.8 (02 Jan 2020 06:17)  HR: 79 (02 Jan 2020 10:34) (77 - 98)  BP: 119/46 (02 Jan 2020 10:34) (101/38 - 129/50)  BP(mean): 54 (01 Jan 2020 17:52) (54 - 58)  RR: 18 (02 Jan 2020 06:17) (17 - 23)  SpO2: 95% (02 Jan 2020 09:55) (95% - 100%)    PHYSICAL EXAM:  GENERAL: NAD, well-groomed, well-developed  HEAD:  Atraumatic, Normocephalic  EYES: EOMI, PERRLA, conjunctiva and sclera clear  NECK: Supple, No JVD, Normal thyroid  CHEST/LUNG: Clear to percussion bilaterally; No rales, rhonchi, wheezing, or rubs  HEART: Regular rate and rhythm; No murmurs, rubs, or gallops  ABDOMEN: Soft, Nontender, Nondistended; Bowel sounds present  NERVOUS SYSTEM:  Alert & Oriented X3, Good concentration; Motor Strength 5/5 B/L   EXTREMITIES:  2+ Peripheral Pulses, No clubbing, cyanosis, or edema  SKIN;    LABS:                        8.5    9.27  )-----------( 116      ( 02 Jan 2020 07:17 )             27.6     01-02    137  |  104  |  13  ----------------------------<  134<H>  4.1   |  27  |  0.62    Ca    8.0<L>      02 Jan 2020 07:17    TPro  7.1  /  Alb  4.1  /  TBili  0.6  /  DBili  x   /  AST  34  /  ALT  30  /  AlkPhos  98  12-31    PT/INR - ( 31 Dec 2019 19:35 )   PT: 11.5 sec;   INR: 1.03 ratio         PTT - ( 31 Dec 2019 19:35 )  PTT:28.4 sec    CAPILLARY BLOOD GLUCOSE          RADIOLOGY & ADDITIONAL TESTS:    Imaging Personally Reviewed:  [ ] YES  [ ] NO    Consultant(s) Notes Reviewed:  [ ] YES  [ ] NO PGY3 Note discussed with supervising resident and primary attending.    Patient is a 88y old  Female who presents with a chief complaint of Rt Hip Fracture (02 Jan 2020 10:12)      INTERVAL HPI/OVERNIGHT EVENTS: Pt was seen and examined at bedside. POD1, Hb dropped from 10.1 to 8.5 after surgery, currently pt is asymptomatic, denies chest pain/shortness of breath/dizziness/melena/brbrp. Pt endorsed she has hx of thalassemia anemia and iron deficiency anemia at age 20-30s, was on PO Iron at that time.      MEDICATIONS  (STANDING):  acetaminophen  IVPB .. 1000 milliGRAM(s) IV Intermittent once  acetaminophen  IVPB .. 1000 milliGRAM(s) IV Intermittent once  aspirin enteric coated 81 milliGRAM(s) Oral daily  atorvastatin 20 milliGRAM(s) Oral at bedtime  enoxaparin Injectable 30 milliGRAM(s) SubCutaneous every 12 hours  latanoprost 0.005% Ophthalmic Solution 1 Drop(s) Both EYES at bedtime  losartan 100 milliGRAM(s) Oral daily  polyethylene glycol 3350 17 Gram(s) Oral daily  senna 2 Tablet(s) Oral at bedtime  sodium chloride 0.9%. 1000 milliLiter(s) (110 mL/Hr) IV Continuous <Continuous>    MEDICATIONS  (PRN):  magnesium hydroxide Suspension 30 milliLiter(s) Oral daily PRN Constipation  ondansetron Injectable 4 milliGRAM(s) IV Push every 6 hours PRN Nausea and/or Vomiting  oxyCODONE    IR 2.5 milliGRAM(s) Oral every 4 hours PRN Severe Pain (7 - 10)      Allergies    penicillin (Unknown)    Intolerances        REVIEW OF SYSTEMS:  negative except above.    Vital Signs Last 24 Hrs  T(C): 37.7 (02 Jan 2020 06:17), Max: 37.7 (02 Jan 2020 06:17)  T(F): 99.8 (02 Jan 2020 06:17), Max: 99.8 (02 Jan 2020 06:17)  HR: 79 (02 Jan 2020 10:34) (77 - 98)  BP: 119/46 (02 Jan 2020 10:34) (101/38 - 129/50)  BP(mean): 54 (01 Jan 2020 17:52) (54 - 58)  RR: 18 (02 Jan 2020 06:17) (17 - 23)  SpO2: 95% (02 Jan 2020 09:55) (95% - 100%)    PHYSICAL EXAM:  GENERAL: NAD  CHEST/LUNG: Clear to percussion bilaterally; No rales, rhonchi, wheezing, or rubs  HEART: Regular rate and rhythm; No murmurs, rubs, or gallops  ABDOMEN: Soft, Nontender, Nondistended; Bowel sounds present  NERVOUS SYSTEM:  Alert & Oriented X3, Good concentration  EXTREMITIES: Right Hip Dressing is C/D/I. Skin is pink and warm.  No erythema  SKIN; no rash     LABS:                        8.5    9.27  )-----------( 116      ( 02 Jan 2020 07:17 )             27.6     01-02    137  |  104  |  13  ----------------------------<  134<H>  4.1   |  27  |  0.62    Ca    8.0<L>      02 Jan 2020 07:17    TPro  7.1  /  Alb  4.1  /  TBili  0.6  /  DBili  x   /  AST  34  /  ALT  30  /  AlkPhos  98  12-31    PT/INR - ( 31 Dec 2019 19:35 )   PT: 11.5 sec;   INR: 1.03 ratio         PTT - ( 31 Dec 2019 19:35 )  PTT:28.4 sec    CAPILLARY BLOOD GLUCOSE          RADIOLOGY & ADDITIONAL TESTS:    Imaging Personally Reviewed:  [ ] YES  [ ] NO    Consultant(s) Notes Reviewed:  [ ] YES  [ ] NO

## 2020-01-02 NOTE — CHART NOTE - NSCHARTNOTEFT_GEN_A_CORE
Orthopedics:    dx: Rt hip long IM nailing pod#1    labs:                        8.5    9.27  )-----------( 116      ( 02 Jan 2020 07:17 )             27.6   01-02    137  |  104  |  x   ----------------------------<  134<H>  4.1   |  27  |  x     Ca    8.0<L>      02 Jan 2020 07:17    TPro  7.1  /  Alb  4.1  /  TBili  0.6  /  DBili  x   /  AST  34  /  ALT  30  /  AlkPhos  98  12-31    Assessment: 89yo f s/p Rt hip IM nailing pod#1 with acute blood loss anemia, post-operative    Plan:  - Anemia:    > Redraw CBC at 2pm (ordered)    > if further drop in h/h, will consider prbc transfusion with dr becker (ortho) discretion    > Pt is asymptomatic at this time  - Case dw dr becker

## 2020-01-02 NOTE — PHYSICAL THERAPY INITIAL EVALUATION ADULT - MANUAL MUSCLE TESTING RESULTS, REHAB EVAL
BUE 4/5, Lt. LE 4/5, Rt. Hip 3-/5, Rt. knee 4/5, Rt. ankle 4/5 BUE 4/5, Lt. LE 4/5, Rt. Hip 2+/5, Rt. knee 4/5, Rt. ankle 4/5

## 2020-01-02 NOTE — CONSULT NOTE ADULT - SUBJECTIVE AND OBJECTIVE BOX
CC:  right hip pain    HPI:  88 y.o female with PMHX of HTN, CAD (PCI x1 stent, no blood thinners) , RA and Raynaud's disease and PSHx of Cataract surgery  presents to the ED with a Rt hip pain s/p mechanical fall today. Pt was standing on a stool in her house,fell of and landed on R hip and unable to walk. Patient denies any head injury, LOC, use of blood thinners, neck pain, chest pain , nausea, vomiting, weakness or paralysis or any other acute complaints. (31 Dec 2019 22:32)      88 year old female s/p right hip IM nailing, pod#1.  + right hip pain.  + pain radiates to leg.  Pain worsens on exertion.  No nausea or vomiting.  No chest pain or sob.     PAIN SCORE:   7/10      SCALE USED: (1-10 VNRS)      PAST MEDICAL & SURGICAL HISTORY:  Rheumatoid arthritis  H/O Raynaud's syndrome  HTN (hypertension)  CAD S/P percutaneous coronary angioplasty  S/P cataract extraction      FAMILY HISTORY:      SOCIAL HISTORY:  [x ] Denies Smoking, Alcohol, or Drug Use    Allergies    penicillin (Unknown)    Intolerances        PAIN MEDICATIONS:  acetaminophen  IVPB .. 1000 milliGRAM(s) IV Intermittent once  acetaminophen  IVPB .. 1000 milliGRAM(s) IV Intermittent once  ketorolac   Injectable 15 milliGRAM(s) IV Push once  ondansetron Injectable 4 milliGRAM(s) IV Push every 6 hours PRN  oxyCODONE    IR 2.5 milliGRAM(s) Oral every 4 hours PRN    Heme:  aspirin enteric coated 81 milliGRAM(s) Oral daily  enoxaparin Injectable 30 milliGRAM(s) SubCutaneous every 12 hours    Antibiotics:    Cardiovascular:  losartan 100 milliGRAM(s) Oral daily    GI:  magnesium hydroxide Suspension 30 milliLiter(s) Oral daily PRN  polyethylene glycol 3350 17 Gram(s) Oral daily  senna 2 Tablet(s) Oral at bedtime    Endocrine:  atorvastatin 20 milliGRAM(s) Oral at bedtime    All Other Medications:  latanoprost 0.005% Ophthalmic Solution 1 Drop(s) Both EYES at bedtime  sodium chloride 0.9%. 1000 milliLiter(s) IV Continuous <Continuous>        Vital Signs Last 24 Hrs  T(C): 37.7 (02 Jan 2020 06:17), Max: 37.7 (02 Jan 2020 06:17)  T(F): 99.8 (02 Jan 2020 06:17), Max: 99.8 (02 Jan 2020 06:17)  HR: 96 (02 Jan 2020 09:55) (77 - 98)  BP: 117/47 (02 Jan 2020 09:55) (101/38 - 129/50)  BP(mean): 54 (01 Jan 2020 17:52) (54 - 58)  RR: 18 (02 Jan 2020 06:17) (17 - 23)  SpO2: 95% (02 Jan 2020 09:55) (95% - 100%)    LABS:                          8.5    9.27  )-----------( 116      ( 02 Jan 2020 07:17 )             27.6     01-02    137  |  104  |  13  ----------------------------<  134<H>  4.1   |  27  |  0.62    Ca    8.0<L>      02 Jan 2020 07:17    TPro  7.1  /  Alb  4.1  /  TBili  0.6  /  DBili  x   /  AST  34  /  ALT  30  /  AlkPhos  98  12-31    PT/INR - ( 31 Dec 2019 19:35 )   PT: 11.5 sec;   INR: 1.03 ratio         PTT - ( 31 Dec 2019 19:35 )  PTT:28.4 sec        REVIEW OF SYSTEMS:  CONSTITUTIONAL: No fever, weight loss, or fatigue  NEUROLOGICAL: No headaches, memory loss, loss of strength, numbness, tremors, dizziness or blurred vision  RESPIRATORY: No cough, wheezing, chills or hemoptysis; No shortness of breath  CARDIOVASCULAR: No chest pain, palpitations, dizziness, or leg swelling  GASTROINTESTINAL: No abdominal or epigastric pain. No nausea, vomiting, or hematemesis; No diarrhea or constipation.  GENITOURINARY: No dysuria, frequency, hematuria, retention or incontinence  MUSCULOSKELETAL: + right hip pain    PHYSICAL EXAM:  GENERAL: NAD, well-groomed, well-developed, no signs of toxicity  NERVOUS SYSTEM:  Alert & Oriented X3, Good concentration  CHEST/LUNG: Clear to auscultation bilaterally; No rales, rhonchi, wheezing, or rubs  HEART: Regular rate and rhythm; No murmurs, rubs, or gallops  ABDOMEN: Soft, Nontender, Nondistended; Bowel sounds present  EXTREMITIES:  2+ Peripheral Pulses, No clubbing, cyanosis, or edema  MUSCULOSKELETAL: + right hip tenderness, intact; + decreased rom      RADIOLOGY:    Drug Screen:          ORT Score -   [ ]  ADRIAN  Reviewed and Copied to Chart
INITIAL HPI:   88 y.o female with PMHX of HTN, CAD (PCI x1 stent, no blood thinners) , RA and Raynaud's disease and PSHx of Cataract surgery  presents to the ED with a Rt hip pain s/p mechanical fall today. Pt was standing on a stool in her house, fell of and landed on R hip and unable to walk. Patient denies any head injury, LOC, use of blood thinners, neck pain, chest pain , nausea, vomiting, weakness or paralysis or any other acute complaints.     Medicine is consulted for preoperative clearance. Patient is AXO=3 states she tripped down from stool and hit her left side of body. She has mild hip pain. She has hx of CAD with one stent in 2015, She ambulates independently and can climb stairs without having any symptoms of dyspnea/ fatigue. She denies chest pain, orthopnea, b/l leg swelling. Her echo was done last month in [facility], which was normal.     In ED, patient's vitals were stable except O2 sat was low in 80s on room air. Patient was placed on O2 via 2 L NC and saturation increased to 90s. Patient was not in respiratory distress. Her fingers were cold which could be reason of not getting correct O2 sat  MEDICATIONS  (STANDING):  dextrose 5% + sodium chloride 0.45%. 1000 milliLiter(s) (100 mL/Hr) IV Continuous <Continuous>  heparin  Injectable 5000 Unit(s) SubCutaneous every 8 hours    MEDICATIONS  (PRN):  morphine  - Injectable 2 milliGRAM(s) IV Push every 4 hours PRN Moderate Pain (4 - 6)      Allergies    penicillin (Unknown)    Intolerances        REVIEW OF SYSTEMS:  CONSTITUTIONAL: No fever, weight loss, or fatigue  RESPIRATORY: No cough, wheezing, chills or hemoptysis; No shortness of breath  CARDIOVASCULAR: No chest pain, palpitations, dizziness, or leg swelling  GASTROINTESTINAL: No abdominal or epigastric pain. No nausea, vomiting, or hematemesis; No diarrhea or constipation.  NEUROLOGICAL: No headaches, memory loss, loss of strength, numbness, or tremors  SKIN: No itching, burning, rashes, or lesions   MUSCULOSKELETAL: Right hip pain    Vital Signs Last 24 Hrs  T(C): 36.9 (01 Jan 2020 01:33), Max: 37.2 (31 Dec 2019 18:34)  T(F): 98.4 (01 Jan 2020 01:33), Max: 98.9 (31 Dec 2019 18:34)  HR: 94 (01 Jan 2020 01:33) (89 - 98)  BP: 129/70 (01 Jan 2020 01:33) (129/70 - 167/69)  BP(mean): --  RR: 18 (01 Jan 2020 01:33) (18 - 18)  SpO2: 91% (01 Jan 2020 01:33) (91% - 96%)    PHYSICAL EXAM:  GENERAL: NAD,   HEAD:  Atraumatic, Normocephalic  EYES: conjunctiva and sclera clear  NECK: Supple, No JVD, Normal thyroid  CHEST/LUNG: Clear to auscultation,  Clear to percussion bilaterally; No rales, rhonchi, wheezing, or rubs  HEART: Regular rate and rhythm; No murmurs, rubs, or gallops  ABDOMEN: Soft, Nontender, Nondistended; Bowel sounds present  NERVOUS SYSTEM:  Alert & Oriented X3,  EXTREMITIES: Minimal pitting edema,  2+ Peripheral Pulses, No clubbing, cyanosis,   SKIN; warm, dry    LABS:                        11.3   13.20 )-----------( 160      ( 31 Dec 2019 19:35 )             36.0     12-31    139  |  107  |  29<H>  ----------------------------<  104<H>  4.7   |  25  |  1.06    Ca    8.8      31 Dec 2019 19:35    TPro  7.1  /  Alb  4.1  /  TBili  0.6  /  DBili  x   /  AST  34  /  ALT  30  /  AlkPhos  98  12-31    PT/INR - ( 31 Dec 2019 19:35 )   PT: 11.5 sec;   INR: 1.03 ratio         PTT - ( 31 Dec 2019 19:35 )  PTT:28.4 sec    CAPILLARY BLOOD GLUCOSE          RADIOLOGY & ADDITIONAL TESTS:    Imaging Personally Reviewed:  [ ] YES  [ ] NO    Consultant(s) Notes Reviewed:  [ ] YES  [ ] NO

## 2020-01-02 NOTE — PHYSICAL THERAPY INITIAL EVALUATION ADULT - ADDITIONAL COMMENTS
Pt. was community dwelling and active. She lives alone, but son and daughter live nearby and provided social support.

## 2020-01-02 NOTE — PROGRESS NOTE ADULT - ATTENDING COMMENTS
Patient seen and examined at bedside this afternoon. Pain is controlled. H/H dropped likely secondary to IM nailing.  PT evaluation noted, recommended [facility]    A/P  -Anemia, likely due to acute blood loss after surgery, also pt has hx of AMOS and thalassemia anemia  -Rt hip fracture s/p r IM nailing Post Op Day 1  -Constipation    Plan:    - Monitor H/H;  transfuse if Hb <7.0,  F/u anemia work up   - Recommend start iron supplementation  - pain control  - c/w  Bowel regimen Patient seen and examined at bedside this afternoon. Pain is controlled. H/H dropped likely secondary to IM nailing.  PT evaluation noted, recommended [facility]    Vital Signs Last 24 Hrs  T(C): 36.9 (02 Jan 2020 13:00), Max: 37.7 (02 Jan 2020 06:17)  T(F): 98.5 (02 Jan 2020 13:00), Max: 99.8 (02 Jan 2020 06:17)  HR: 82 (02 Jan 2020 13:00) (77 - 98)  BP: 99/46 (02 Jan 2020 13:00) (99/46 - 129/50)  BP(mean): 54 (01 Jan 2020 17:52) (54 - 58)  RR: 16 (02 Jan 2020 13:00) (16 - 23)  SpO2: 98% (02 Jan 2020 13:00) (95% - 100%)    OE  NAD  RRR S1s2 no murmur  clear lungs, no rales  soft, NT, +BS  Awake, alert ,oriented x4, intact cognition     A/P  -Rt hip fracture s/p r IM nailing Post Op Day 1  -Anemia, likely due to acute blood loss after surgery, also pt has hx of AMOS and thalassemia anemia  -Constipation opioid induced  -CAD s/p stent    Plan:    - Monitor H/H;  goal hb >8 given hx of CAD,  F/u anemia work up   - Recommend start iron supplementation  - pain control  - c/w  Bowel regimen  - DVT ppx

## 2020-01-02 NOTE — PROGRESS NOTE ADULT - ASSESSMENT
88 y.o female with PMHX of AMOS, thalassemia anemia,  HTN, CAD (PCI x1 stent, no blood thinners) , RA and Raynaud's disease and PSHx of Cataract surgery presents to the ED with a Rt hip pain s/p mechanical fall. Found has Rt intertrochanteric fracture of Hip, s/p right IM nailing, pod#1.     Assessment and Plan:   -Anemia, likely due to acute blood loss after surgery, also pt has hx of AMOS and thalassemia anemia  -Rt hip fracture      Plan:    -f/u repeat CBC and will need transfusion if Hb <7.0   -f/u anemia panel   -recommend to start PO iron   -trend CBC q 8-12hrs   -pain management   -continue current care as per surgery team

## 2020-01-02 NOTE — PHYSICAL THERAPY INITIAL EVALUATION ADULT - GAIT DISTANCE, PT EVAL
6 feet total: 3 feet forward and 3 feet backward 6 feet total: (3 feet forward and 3 feet backward) Pt. w/vasquez, returned to sitting

## 2020-01-02 NOTE — PHYSICAL THERAPY INITIAL EVALUATION ADULT - PHYSICAL ASSIST/NONPHYSICAL ASSIST: GAIT, REHAB EVAL
verbal cues/nonverbal cues (demo/gestures) verbal cues/1 person assist/nonverbal cues (demo/gestures)

## 2020-01-02 NOTE — PROGRESS NOTE ADULT - SUBJECTIVE AND OBJECTIVE BOX
88yFemale    Diagnosis:  S/p Right Hip IM Nailing POD#1    Patient was seen and evaluated at bedside. Patient with no acute complaints.   Pain is  well controlled. Patient notes overall improvement in her symptoms.    Denies CP/SOB, dyspnea, paresthesias, N/V/D, palpitations.     Vital Signs Last 24 Hrs  T(C): 37.7 (02 Jan 2020 06:17), Max: 37.7 (02 Jan 2020 06:17)  T(F): 99.8 (02 Jan 2020 06:17), Max: 99.8 (02 Jan 2020 06:17)  HR: 92 (02 Jan 2020 06:17) (77 - 98)  BP: 129/50 (02 Jan 2020 06:17) (101/38 - 129/50)  BP(mean): 54 (01 Jan 2020 17:52) (54 - 58)  RR: 18 (02 Jan 2020 06:17) (17 - 23)  SpO2: 98% (02 Jan 2020 06:17) (95% - 100%)  I&O's Detail    01 Jan 2020 07:01  -  02 Jan 2020 07:00  --------------------------------------------------------  IN:    Lactated Ringers IV Bolus: 1050 mL  Total IN: 1050 mL    OUT:    Indwelling Catheter - Urethral: 1400 mL  Total OUT: 1400 mL    Total NET: -350 mL    Physical Exam:    General: AAOx3, NAD, resting comfortably in bed.    Right Hip:  Dressing is C/D/I. Skin is pink and warm.  No erythema. SILT.    Lower extremity:  No calf tenderness, calves are soft. 2+pulses. NVI. 5/5 Strength of EHL/TA/gastrocnemius B/L.  Good capillary refill. Warm, well-perfused.                           10.1   10.07 )-----------( 140      ( 01 Jan 2020 06:28 )             33.2     01-01    140  |  108  |  25<H>  ----------------------------<  118<H>  4.7   |  27  |  0.74    Ca    8.2<L>      01 Jan 2020 06:28    TPro  7.1  /  Alb  4.1  /  TBili  0.6  /  DBili  x   /  AST  34  /  ALT  30  /  AlkPhos  98  12-31      Impression:  88yFemale S/p Right Hip IM Nailing POD#1  Plan:  -  Pain management  -  Dvt prophylaxis with lovenox and SCDs  -  Daily Physical Therapy  WBAT of the Right lower extremity with appropropria  -  Discharge planning: Home Vs. Rehab pending PT eval   -  Continue with Post-op Antibiotics x 24hrs  -  Discontinue Tracy catheter today  -  Dressing change POD#2  -  Case d/w DR. Gunter

## 2020-01-02 NOTE — PHYSICAL THERAPY INITIAL EVALUATION ADULT - TRANSFER SAFETY CONCERNS NOTED: BED/CHAIR, REHAB EVAL
decreased weight-shifting ability decreased weight-shifting ability/decreased step length/decreased balance during turns

## 2020-01-02 NOTE — PHYSICAL THERAPY INITIAL EVALUATION ADULT - GENERAL OBSERVATIONS, REHAB EVAL
Consult received, chart reviewed. Patient received supine in bed, HOB elevated, NAD, +O2 via NC, Rt. hip bandage c/d/i. Daughter present at bedside. Patient agreed to EVALUATION from Physical Therapist. Consult received, chart reviewed. Patient received supine in bed, HOB elevated, NAD, +O2 via NC, + SCD Rt. hip bandage c/d/i. Daughter present at bedside. Patient agreed to EVALUATION from Physical Therapist. Consult received, chart reviewed. Patient received supine in bed, HOB elevated, NAD, +O2 via NC, + SCDs Rt. hip bandage c/d/i. Daughter present at bedside. Patient agreed to EVALUATION from Physical Therapist.

## 2020-01-02 NOTE — PHYSICAL THERAPY INITIAL EVALUATION ADULT - PASSIVE RANGE OF MOTION EXAMINATION, REHAB EVAL
bilateral lower extremity Passive ROM was WFL (within functional limits)/deficits as listed below/bilateral upper extremity Passive ROM was WFL (within functional limits)/Rt. hip limited to 1/3 range. deficits as listed below/bilateral upper extremity Passive ROM was WFL (within functional limits)/bilateral lower extremity Passive ROM was WFL (within functional limits)/Rt. hip limited to 1/3 range., pain limited

## 2020-01-02 NOTE — CHART NOTE - NSCHARTNOTEFT_GEN_A_CORE
Orthopedic addendum:    dx: acute blood loss anemia    rn paged regarding pt with an episode of hypotension, 85/58mmhg while sitting in chair  pt seen and evaluated with c/o fatigue  Pt denies Chest pain, SOB, dyspnea, paresthesias, N/V/D, abdominal pain, syncope, or pain anywhere else.     Assessment: 87yo f s/p Rt hip IM nail with acute blood loss anemia, post-operative loss  Plan:  - Recommended 1unit prbc transfx now and pt is undecided regarding accepting blood    > Pt was explained the risks/benefits of blood transfusion such as, but not limited to: MI, syncope, etc, risk of fall, weakness, etc    > Pt accepts risks at this time and does not want to sign consent for blood transfusion. Pt's PCP was reach out to. Dr Taiwo Pires (cardiologist)[phone]  - Case d/w Dr Gaitan and dr becker Orthopedic addendum:    dx: acute blood loss anemia    rn paged regarding pt with an episode of hypotension, 85/58mmhg while sitting in chair  pt seen and evaluated with c/o fatigue  Pt denies Chest pain, SOB, dyspnea, paresthesias, N/V/D, abdominal pain, syncope, or pain anywhere else.     Assessment: 89yo f s/p Rt hip IM nail with acute blood loss anemia, post-operative loss  Plan:  - Recommended 1unit prbc transfx now and pt is undecided regarding accepting blood    > Pt was explained the risks/benefits of blood transfusion such as, but not limited to: MI, syncope, etc, risk of fall, weakness, etc    > Pt accepts risks at this time and does not want to sign consent for blood transfusion. She is aware and accepts all possible outcomes. She feels nervous at this time about receiving blood transfx. Pt's PCP was reach out to. Dr Taiwo Pires (cardiologist)[phone]  - Case d/w Dr Gaitan and dr becker Orthopedic addendum:    dx: acute blood loss anemia    rn paged regarding pt with an episode of hypotension, 85/58mmhg while sitting in chair  pt seen and evaluated with c/o fatigue  Pt denies Chest pain, SOB, dyspnea, paresthesias, N/V/D, abdominal pain, syncope, or pain anywhere else.     Assessment: 87yo f s/p Rt hip IM nail with acute blood loss anemia, post-operative loss  Plan:  - Recommended 1unit prbc transfx now and pt is undecided regarding accepting blood    > Pt was explained the risks/benefits of blood transfusion such as, but not limited to: MI, syncope, etc, risk of fall, weakness, etc    > Pt accepts risks at this time and does not want to sign consent for blood transfusion. She is aware and accepts all possible outcomes. She feels nervous at this time about receiving blood transfx. Pt's PCP was reach out to. Dr Taiwo Pires (cardiologist)[phone]  - Case d/w Dr Gaitan and dr becker      _________________________________________________________________________  UPDATE  _________________________________________________________________________    Got in touch with PCP  PCP spoke with family via telephone to reassure pt  Pt finally consented to 1u prbc transfusion tonight  1u prbc ordered STAT  check cbc in am  case dw dr becker Orthopedic addendum:    dx: acute blood loss anemia    rn paged regarding pt with an episode of hypotension, 85/58mmhg while sitting in chair  pt seen and evaluated with c/o fatigue  Pt denies Chest pain, SOB, dyspnea, paresthesias, N/V/D, abdominal pain, syncope, or pain anywhere else.     Assessment: 89yo f s/p Rt hip IM nail with acute blood loss anemia, post-operative loss  Plan:  - Recommended 1unit prbc transfx now and pt is undecided regarding accepting blood    > Pt was explained the risks/benefits of blood transfusion such as, but not limited to: MI, syncope, etc, risk of fall, weakness, etc    > Pt accepts risks at this time and does not want to sign consent for blood transfusion. She is aware and accepts all possible outcomes. She feels nervous at this time about receiving blood transfx. Pt's PCP was reach out to. Dr Taiwo Pires (cardiologist)[phone]  - Case d/w Dr Gaitan and dr becker      _________________________________________________________________________  UPDATE  _________________________________________________________________________    Got in touch with PCP now  PCP spoke with family via telephone to reassure pt  Pt finally consented to 1u prbc transfusion tonight  1u prbc ordered STAT  check cbc in am  case dw dr becker

## 2020-01-02 NOTE — PHYSICAL THERAPY INITIAL EVALUATION ADULT - CRITERIA FOR SKILLED THERAPEUTIC INTERVENTIONS
impairments found/predicted duration of therapy intervention/therapy frequency/anticipated discharge recommendation/risk reduction/prevention/rehab potential/functional limitations in following categories

## 2020-01-02 NOTE — CONSULT NOTE ADULT - PROBLEM SELECTOR RECOMMENDATION 9
- s/p right IM nailing, pod#1  - toradol 15mg ivp once  - iv acetaminophen for 4 doses   - stool softeners  - no Iv opioids  - oxycodone 2.5mg po q 5 hours prn   - OOB/ PT

## 2020-01-03 ENCOUNTER — TRANSCRIPTION ENCOUNTER (OUTPATIENT)
Age: 85
End: 2020-01-03

## 2020-01-03 VITALS
TEMPERATURE: 99 F | HEART RATE: 91 BPM | DIASTOLIC BLOOD PRESSURE: 42 MMHG | OXYGEN SATURATION: 95 % | SYSTOLIC BLOOD PRESSURE: 114 MMHG | RESPIRATION RATE: 17 BRPM

## 2020-01-03 PROBLEM — Z86.79 PERSONAL HISTORY OF OTHER DISEASES OF THE CIRCULATORY SYSTEM: Chronic | Status: ACTIVE | Noted: 2019-12-31

## 2020-01-03 PROBLEM — I10 ESSENTIAL (PRIMARY) HYPERTENSION: Chronic | Status: ACTIVE | Noted: 2019-12-31

## 2020-01-03 PROBLEM — I25.10 ATHEROSCLEROTIC HEART DISEASE OF NATIVE CORONARY ARTERY WITHOUT ANGINA PECTORIS: Chronic | Status: ACTIVE | Noted: 2019-12-31

## 2020-01-03 PROBLEM — M06.9 RHEUMATOID ARTHRITIS, UNSPECIFIED: Chronic | Status: ACTIVE | Noted: 2019-12-31

## 2020-01-03 LAB
ANION GAP SERPL CALC-SCNC: 5 MMOL/L — SIGNIFICANT CHANGE UP (ref 5–17)
BASOPHILS # BLD AUTO: 0.03 K/UL — SIGNIFICANT CHANGE UP (ref 0–0.2)
BASOPHILS NFR BLD AUTO: 0.4 % — SIGNIFICANT CHANGE UP (ref 0–2)
BLD GP AB SCN SERPL QL: SIGNIFICANT CHANGE UP
BUN SERPL-MCNC: 12 MG/DL — SIGNIFICANT CHANGE UP (ref 7–18)
CALCIUM SERPL-MCNC: 8.2 MG/DL — LOW (ref 8.4–10.5)
CHLORIDE SERPL-SCNC: 106 MMOL/L — SIGNIFICANT CHANGE UP (ref 96–108)
CO2 SERPL-SCNC: 27 MMOL/L — SIGNIFICANT CHANGE UP (ref 22–31)
CREAT SERPL-MCNC: 0.63 MG/DL — SIGNIFICANT CHANGE UP (ref 0.5–1.3)
EOSINOPHIL # BLD AUTO: 0.21 K/UL — SIGNIFICANT CHANGE UP (ref 0–0.5)
EOSINOPHIL NFR BLD AUTO: 2.6 % — SIGNIFICANT CHANGE UP (ref 0–6)
FERRITIN SERPL-MCNC: 460 NG/ML — HIGH (ref 15–150)
FOLATE SERPL-MCNC: 18.2 NG/ML — SIGNIFICANT CHANGE UP
GLUCOSE SERPL-MCNC: 105 MG/DL — HIGH (ref 70–99)
HCT VFR BLD CALC: 30 % — LOW (ref 34.5–45)
HGB BLD-MCNC: 9.4 G/DL — LOW (ref 11.5–15.5)
IMM GRANULOCYTES NFR BLD AUTO: 0.8 % — SIGNIFICANT CHANGE UP (ref 0–1.5)
IRON SATN MFR SERPL: 17 % — SIGNIFICANT CHANGE UP (ref 15–50)
IRON SATN MFR SERPL: 36 UG/DL — LOW (ref 40–150)
LYMPHOCYTES # BLD AUTO: 1.36 K/UL — SIGNIFICANT CHANGE UP (ref 1–3.3)
LYMPHOCYTES # BLD AUTO: 17 % — SIGNIFICANT CHANGE UP (ref 13–44)
MCHC RBC-ENTMCNC: 22.2 PG — LOW (ref 27–34)
MCHC RBC-ENTMCNC: 31.3 GM/DL — LOW (ref 32–36)
MCV RBC AUTO: 70.9 FL — LOW (ref 80–100)
MONOCYTES # BLD AUTO: 0.5 K/UL — SIGNIFICANT CHANGE UP (ref 0–0.9)
MONOCYTES NFR BLD AUTO: 6.3 % — SIGNIFICANT CHANGE UP (ref 2–14)
NEUTROPHILS # BLD AUTO: 5.84 K/UL — SIGNIFICANT CHANGE UP (ref 1.8–7.4)
NEUTROPHILS NFR BLD AUTO: 72.9 % — SIGNIFICANT CHANGE UP (ref 43–77)
NRBC # BLD: 0 /100 WBCS — SIGNIFICANT CHANGE UP (ref 0–0)
PLATELET # BLD AUTO: 96 K/UL — LOW (ref 150–400)
POTASSIUM SERPL-MCNC: 3.9 MMOL/L — SIGNIFICANT CHANGE UP (ref 3.5–5.3)
POTASSIUM SERPL-SCNC: 3.9 MMOL/L — SIGNIFICANT CHANGE UP (ref 3.5–5.3)
RBC # BLD: 4.23 M/UL — SIGNIFICANT CHANGE UP (ref 3.8–5.2)
RBC # FLD: 18.6 % — HIGH (ref 10.3–14.5)
SODIUM SERPL-SCNC: 138 MMOL/L — SIGNIFICANT CHANGE UP (ref 135–145)
TIBC SERPL-MCNC: 211 UG/DL — LOW (ref 250–450)
UIBC SERPL-MCNC: 175 UG/DL — SIGNIFICANT CHANGE UP (ref 110–370)
VIT B12 SERPL-MCNC: 747 PG/ML — SIGNIFICANT CHANGE UP (ref 232–1245)
WBC # BLD: 8 K/UL — SIGNIFICANT CHANGE UP (ref 3.8–10.5)
WBC # FLD AUTO: 8 K/UL — SIGNIFICANT CHANGE UP (ref 3.8–10.5)

## 2020-01-03 PROCEDURE — 73552 X-RAY EXAM OF FEMUR 2/>: CPT

## 2020-01-03 PROCEDURE — 86901 BLOOD TYPING SEROLOGIC RH(D): CPT

## 2020-01-03 PROCEDURE — 82746 ASSAY OF FOLIC ACID SERUM: CPT

## 2020-01-03 PROCEDURE — C1889: CPT

## 2020-01-03 PROCEDURE — 71045 X-RAY EXAM CHEST 1 VIEW: CPT | Mod: 26

## 2020-01-03 PROCEDURE — C1769: CPT

## 2020-01-03 PROCEDURE — 93005 ELECTROCARDIOGRAM TRACING: CPT

## 2020-01-03 PROCEDURE — 80053 COMPREHEN METABOLIC PANEL: CPT

## 2020-01-03 PROCEDURE — 36415 COLL VENOUS BLD VENIPUNCTURE: CPT

## 2020-01-03 PROCEDURE — 85730 THROMBOPLASTIN TIME PARTIAL: CPT

## 2020-01-03 PROCEDURE — 76000 FLUOROSCOPY <1 HR PHYS/QHP: CPT

## 2020-01-03 PROCEDURE — P9040: CPT

## 2020-01-03 PROCEDURE — 97162 PT EVAL MOD COMPLEX 30 MIN: CPT

## 2020-01-03 PROCEDURE — 86923 COMPATIBILITY TEST ELECTRIC: CPT

## 2020-01-03 PROCEDURE — 73502 X-RAY EXAM HIP UNI 2-3 VIEWS: CPT

## 2020-01-03 PROCEDURE — C1713: CPT

## 2020-01-03 PROCEDURE — 96375 TX/PRO/DX INJ NEW DRUG ADDON: CPT

## 2020-01-03 PROCEDURE — 83540 ASSAY OF IRON: CPT

## 2020-01-03 PROCEDURE — 97116 GAIT TRAINING THERAPY: CPT

## 2020-01-03 PROCEDURE — 85027 COMPLETE CBC AUTOMATED: CPT

## 2020-01-03 PROCEDURE — 96376 TX/PRO/DX INJ SAME DRUG ADON: CPT

## 2020-01-03 PROCEDURE — 99285 EMERGENCY DEPT VISIT HI MDM: CPT | Mod: 25

## 2020-01-03 PROCEDURE — 82728 ASSAY OF FERRITIN: CPT

## 2020-01-03 PROCEDURE — 99238 HOSP IP/OBS DSCHRG MGMT 30/<: CPT

## 2020-01-03 PROCEDURE — 97530 THERAPEUTIC ACTIVITIES: CPT

## 2020-01-03 PROCEDURE — 80048 BASIC METABOLIC PNL TOTAL CA: CPT

## 2020-01-03 PROCEDURE — 71045 X-RAY EXAM CHEST 1 VIEW: CPT

## 2020-01-03 PROCEDURE — 86850 RBC ANTIBODY SCREEN: CPT

## 2020-01-03 PROCEDURE — 96374 THER/PROPH/DIAG INJ IV PUSH: CPT

## 2020-01-03 PROCEDURE — 85610 PROTHROMBIN TIME: CPT

## 2020-01-03 PROCEDURE — 83550 IRON BINDING TEST: CPT

## 2020-01-03 PROCEDURE — 36430 TRANSFUSION BLD/BLD COMPNT: CPT

## 2020-01-03 PROCEDURE — 86900 BLOOD TYPING SEROLOGIC ABO: CPT

## 2020-01-03 PROCEDURE — 82607 VITAMIN B-12: CPT

## 2020-01-03 RX ORDER — TRAMADOL HYDROCHLORIDE 50 MG/1
1 TABLET ORAL
Qty: 0 | Refills: 0 | DISCHARGE

## 2020-01-03 RX ORDER — FERROUS SULFATE 325(65) MG
1 TABLET ORAL
Qty: 0 | Refills: 0 | DISCHARGE
Start: 2020-01-03

## 2020-01-03 RX ORDER — ENOXAPARIN SODIUM 100 MG/ML
30 INJECTION SUBCUTANEOUS
Qty: 0 | Refills: 0 | DISCHARGE
End: 2020-01-16

## 2020-01-03 RX ORDER — MAGNESIUM HYDROXIDE 400 MG/1
30 TABLET, CHEWABLE ORAL
Qty: 0 | Refills: 0 | DISCHARGE
Start: 2020-01-03

## 2020-01-03 RX ORDER — ASCORBIC ACID 60 MG
1 TABLET,CHEWABLE ORAL
Qty: 0 | Refills: 0 | DISCHARGE
Start: 2020-01-03

## 2020-01-03 RX ORDER — LOSARTAN POTASSIUM 100 MG/1
1 TABLET, FILM COATED ORAL
Qty: 0 | Refills: 0 | DISCHARGE

## 2020-01-03 RX ORDER — POLYETHYLENE GLYCOL 3350 17 G/17G
17 POWDER, FOR SOLUTION ORAL
Qty: 0 | Refills: 0 | DISCHARGE
Start: 2020-01-03

## 2020-01-03 RX ORDER — OXYCODONE HYDROCHLORIDE 5 MG/1
2.5 TABLET ORAL
Qty: 0 | Refills: 0 | DISCHARGE
Start: 2020-01-03

## 2020-01-03 RX ORDER — SENNA PLUS 8.6 MG/1
2 TABLET ORAL
Qty: 0 | Refills: 0 | DISCHARGE
Start: 2020-01-03

## 2020-01-03 RX ORDER — ACETAMINOPHEN 500 MG
975 TABLET ORAL
Qty: 0 | Refills: 0 | DISCHARGE

## 2020-01-03 RX ADMIN — Medication 1000 MILLIGRAM(S): at 01:48

## 2020-01-03 RX ADMIN — Medication 500 MILLIGRAM(S): at 12:06

## 2020-01-03 RX ADMIN — Medication 400 MILLIGRAM(S): at 09:09

## 2020-01-03 RX ADMIN — Medication 15 MILLIGRAM(S): at 12:06

## 2020-01-03 RX ADMIN — Medication 1000 MILLIGRAM(S): at 09:24

## 2020-01-03 RX ADMIN — Medication 325 MILLIGRAM(S): at 12:06

## 2020-01-03 RX ADMIN — Medication 400 MILLIGRAM(S): at 01:18

## 2020-01-03 RX ADMIN — ENOXAPARIN SODIUM 30 MILLIGRAM(S): 100 INJECTION SUBCUTANEOUS at 05:49

## 2020-01-03 RX ADMIN — Medication 15 MILLIGRAM(S): at 12:21

## 2020-01-03 RX ADMIN — Medication 81 MILLIGRAM(S): at 12:06

## 2020-01-03 NOTE — PROGRESS NOTE ADULT - PROBLEM SELECTOR PLAN 1
- s/p right IM nailing, pod#2  - iv acetaminophen dose x1  - toradol 15mg ivp q 6 hours for 2 doses  - oxycodone 2.5mg ivp q 4 hours prn severe pain  - stool softeners   - OOB/ stool softeners - s/p right IM nailing, pod#2  - iv acetaminophen dose x1  - toradol 15mg ivp q 6 hours for 2 doses  - oxycodone 2.5mg ivp q 4 hours prn severe pain  - stool softeners   - OOB/ stool softeners  - dc to rehab on tylenol and tramadol

## 2020-01-03 NOTE — PROGRESS NOTE ADULT - SUBJECTIVE AND OBJECTIVE BOX
88yFemale    Diagnosis:  S/p Right Hip IM Nailing POD#2    Patient was seen and evaluated at bedside. Patient with no acute complaints.   Pain is  well controlled.   Patient received 1 unit pRBC on 1/2/20, with no adverse reactions  Denies CP/SOB, dyspnea, paresthesias, N/V/D, palpitations.     Vital Signs Last 24 Hrs  T(C): 37.1 (03 Jan 2020 05:28), Max: 37.5 (02 Jan 2020 21:00)  T(F): 98.8 (03 Jan 2020 05:28), Max: 99.5 (02 Jan 2020 21:00)  HR: 88 (03 Jan 2020 05:28) (76 - 96)  BP: 127/51 (03 Jan 2020 05:28) (89/31 - 130/42)  BP(mean): --  RR: 18 (03 Jan 2020 05:28) (16 - 20)  SpO2: 94% (03 Jan 2020 05:28) (92% - 98%)  I&O's Detail      Physical Exam:    General: AAOx3, NAD, resting comfortably in bed.    Right Hip:  Dressing is C/D/I. Skin is pink and warm. Staples intact. No erythema. SILT.  Wound with no drainage, healing well.   Lower extremity:  No calf tenderness, calves are soft. 2+pulses. NVI. 5/5 Strength of EHL/TA/gastrocnemius B/L.  Good capillary refill. Warm, well-perfused.                           9.4    8.00  )-----------( x        ( 03 Jan 2020 07:14 )             30.0     01-02    137  |  104  |  13  ----------------------------<  134<H>  4.1   |  27  |  0.62    Ca    8.0<L>      02 Jan 2020 07:17        Impression:  88yFemale S/p Right Hip IM Nailing POD#2  Plan:  -  Pain management  -  Dvt prophylaxis with lovenox and SCDs  -  Daily Physical Therapy  WBAT of the Right lower extremity with appropriate assistive device   -  Discharge planning: [facility]  -  S/p 1 unit pRBC on 1/3/20  -  Dressing to be changed today  -  Case d/w DR. Gunter 88yFemale    Diagnosis:  S/p Right Hip IM Nailing POD#2    Patient was seen and evaluated at bedside. Patient with no acute complaints.   Pain is  well controlled.   Patient received 1 unit pRBC on 1/2/20, with no adverse reactions  Denies CP/SOB, dyspnea, paresthesias, N/V/D, palpitations.     Vital Signs Last 24 Hrs  T(C): 37.1 (03 Jan 2020 05:28), Max: 37.5 (02 Jan 2020 21:00)  T(F): 98.8 (03 Jan 2020 05:28), Max: 99.5 (02 Jan 2020 21:00)  HR: 88 (03 Jan 2020 05:28) (76 - 96)  BP: 127/51 (03 Jan 2020 05:28) (89/31 - 130/42)  BP(mean): --  RR: 18 (03 Jan 2020 05:28) (16 - 20)  SpO2: 94% (03 Jan 2020 05:28) (92% - 98%)  I&O's Detail      Physical Exam:    General: AAOx3, NAD, resting comfortably in bed.    Right Hip:  Dressing is C/D/I. Skin is pink and warm. Staples intact. No erythema. SILT.  Wound with no drainage, healing well.   Lower extremity:  No calf tenderness, calves are soft. 2+pulses. NVI. 5/5 Strength of EHL/TA/gastrocnemius B/L.  Good capillary refill. Warm, well-perfused.                           9.4    8.00  )-----------( x        ( 03 Jan 2020 07:14 )             30.0     01-02    137  |  104  |  13  ----------------------------<  134<H>  4.1   |  27  |  0.62    Ca    8.0<L>      02 Jan 2020 07:17        Impression:  88yFemale S/p Right Hip IM Nailing POD#2  Plan:  -  Pain management  -  Dvt prophylaxis with lovenox and SCDs  -  Daily Physical Therapy  WBAT of the Right lower extremity with appropriate assistive device   -  Discharge planning: [facility]  -  S/p 1 unit pRBC on 01/02/20  -  Dressing to be changed today  -  Case d/w DR. Gunter

## 2020-01-03 NOTE — DISCHARGE NOTE PROVIDER - HOSPITAL COURSE
Patient is a 87y/o  s/p right hip intramedullary nailing on 01/01/20  . Patient recieved 1 unit pRBC on 01/02/20 for acute post-op blood loss anemia. Patient received physical therapy and pain management throughout hospital stay. Patient is a 87y/o female with a pmhx of CAD with stents, rheumatoid arthritis, HTN admitted with right hip fractures status post mechanical fall. Patient is  s/p right hip intramedullary nailing on 01/01/20. Post operatively, she received 1 unit of pRBC on 01/02/20 for acute post-op blood loss anemia. Post transfusion Hgb/HCT remained stable. Pt noted with mild thrombocytopenia which may be transfusion related as well. Patient received physical therapy and pain management throughout hospital stay.         Patient seen and evaluated at bedside on the day of discharge by the medical team. She denied chest pain, sob, lightheadedness, palpitations. Her pain was well controlled. Her antihypertensive medication of losartan was held as her blood pressure has normalized. Upon discharge to acute rehab, the losartan may be resumed if her SBP returns to a persistent hypertensive state.

## 2020-01-03 NOTE — DISCHARGE NOTE PROVIDER - CARE PROVIDER_API CALL
Preston Gunter)  Orthopaedic Surgery; Sports Medicine  [address] [place] [address], [address] [place] [address]  Phone: [phone]  Fax: [phone]  Follow Up Time: 2 weeks

## 2020-01-03 NOTE — PROGRESS NOTE ADULT - REASON FOR ADMISSION
Rt Hip Fracture

## 2020-01-03 NOTE — PROGRESS NOTE ADULT - SUBJECTIVE AND OBJECTIVE BOX
NP Note discussed with  Primary Attending    Patient is a 88y old  Female who presents with a chief complaint of Rt Hip Fracture (03 Jan 2020 07:51)      INTERVAL HPI/OVERNIGHT EVENTS: no new complaints    MEDICATIONS  (STANDING):  ascorbic acid 500 milliGRAM(s) Oral daily  aspirin enteric coated 81 milliGRAM(s) Oral daily  atorvastatin 20 milliGRAM(s) Oral at bedtime  bisacodyl 5 milliGRAM(s) Oral once  enoxaparin Injectable 30 milliGRAM(s) SubCutaneous every 12 hours  ferrous    sulfate 325 milliGRAM(s) Oral daily  latanoprost 0.005% Ophthalmic Solution 1 Drop(s) Both EYES at bedtime  losartan 100 milliGRAM(s) Oral daily  polyethylene glycol 3350 17 Gram(s) Oral daily  senna 2 Tablet(s) Oral at bedtime  sodium chloride 0.9%. 1000 milliLiter(s) (110 mL/Hr) IV Continuous <Continuous>    MEDICATIONS  (PRN):  ketorolac   Injectable 15 milliGRAM(s) IV Push every 6 hours PRN Moderate Pain (4 - 6)  magnesium hydroxide Suspension 30 milliLiter(s) Oral daily PRN Constipation  ondansetron Injectable 4 milliGRAM(s) IV Push every 6 hours PRN Nausea and/or Vomiting  oxyCODONE    IR 2.5 milliGRAM(s) Oral every 4 hours PRN Severe Pain (7 - 10)      __________________________________________________  REVIEW OF SYSTEMS:    CONSTITUTIONAL: No fever,   EYES: no acute visual disturbances  NECK: No pain or stiffness  RESPIRATORY: No cough; No shortness of breath  CARDIOVASCULAR: No chest pain, no palpitations  GASTROINTESTINAL: No pain. No nausea or vomiting; No diarrhea   NEUROLOGICAL: No headache or numbness, no tremors  MUSCULOSKELETAL: No joint pain, no muscle pain  GENITOURINARY: no dysuria, no frequency, no hesitancy  PSYCHIATRY: no depression , no anxiety  ALL OTHER  ROS negative        Vital Signs Last 24 Hrs  T(C): 37.1 (03 Jan 2020 05:28), Max: 37.5 (02 Jan 2020 21:00)  T(F): 98.8 (03 Jan 2020 05:28), Max: 99.5 (02 Jan 2020 21:00)  HR: 88 (03 Jan 2020 05:28) (76 - 94)  BP: 127/51 (03 Jan 2020 05:28) (89/31 - 130/42)  BP(mean): --  RR: 18 (03 Jan 2020 05:28) (16 - 20)  SpO2: 94% (03 Jan 2020 05:28) (92% - 98%)    ________________________________________________  PHYSICAL EXAM:  GENERAL: NAD  HEENT: Normocephalic;  conjunctivae and sclerae clear; moist mucous membranes;   NECK : supple  CHEST/LUNG: Clear to auscultation bilaterally with good air entry   HEART: S1 S2  regular; no murmurs, gallops or rubs  ABDOMEN: Soft, Nontender, Nondistended; Bowel sounds present  EXTREMITIES: no cyanosis; no edema; no calf tenderness  SKIN: warm and dry; no rash  NERVOUS SYSTEM:  Awake and alert; Oriented  to place, person and time ; no new deficits  MUSCULOSKELETAL:  _________________________________________________  LABS:                        9.4    8.00  )-----------( x        ( 03 Jan 2020 07:14 )             30.0     01-03    138  |  106  |  12  ----------------------------<  105<H>  3.9   |  27  |  0.63    Ca    8.2<L>      03 Jan 2020 07:14          CAPILLARY BLOOD GLUCOSE            RADIOLOGY & ADDITIONAL TESTS:    Imaging Personally Reviewed:  YES/NO    Consultant(s) Notes Reviewed:   YES/ No    Care Discussed with Consultants :     Plan of care was discussed with patient and /or primary care giver; all questions and concerns were addressed and care was aligned with patient's wishes. NP Note discussed with  Primary Attending    CC:  right hip pain    Patient is a 88y old  Female who presents with a chief complaint of Rt Hip Fracture (03 Jan 2020 07:51).  Pt s/p right hip IM nailing, pod#2.  Pt pain much better controlled today.  + right hip pain which increases on exertion.  No nausea or vomiting.  No chest pain or sob.        INTERVAL HPI/OVERNIGHT EVENTS: no new complaints    MEDICATIONS  (STANDING):  ascorbic acid 500 milliGRAM(s) Oral daily  aspirin enteric coated 81 milliGRAM(s) Oral daily  atorvastatin 20 milliGRAM(s) Oral at bedtime  bisacodyl 5 milliGRAM(s) Oral once  enoxaparin Injectable 30 milliGRAM(s) SubCutaneous every 12 hours  ferrous    sulfate 325 milliGRAM(s) Oral daily  latanoprost 0.005% Ophthalmic Solution 1 Drop(s) Both EYES at bedtime  losartan 100 milliGRAM(s) Oral daily  polyethylene glycol 3350 17 Gram(s) Oral daily  senna 2 Tablet(s) Oral at bedtime  sodium chloride 0.9%. 1000 milliLiter(s) (110 mL/Hr) IV Continuous <Continuous>    MEDICATIONS  (PRN):  ketorolac   Injectable 15 milliGRAM(s) IV Push every 6 hours PRN Moderate Pain (4 - 6)  magnesium hydroxide Suspension 30 milliLiter(s) Oral daily PRN Constipation  ondansetron Injectable 4 milliGRAM(s) IV Push every 6 hours PRN Nausea and/or Vomiting  oxyCODONE    IR 2.5 milliGRAM(s) Oral every 4 hours PRN Severe Pain (7 - 10)      __________________________________________________  REVIEW OF SYSTEMS:    CONSTITUTIONAL: No fever,   RESPIRATORY: No cough; No shortness of breath  CARDIOVASCULAR: No chest pain, no palpitations  GASTROINTESTINAL: No pain. No nausea or vomiting; No diarrhea   NEUROLOGICAL: No headache or numbness, no tremors  MUSCULOSKELETAL: + right hip pain  GENITOURINARY: no dysuria, no frequency, no hesitancy        Vital Signs Last 24 Hrs  T(C): 37.1 (03 Jan 2020 05:28), Max: 37.5 (02 Jan 2020 21:00)  T(F): 98.8 (03 Jan 2020 05:28), Max: 99.5 (02 Jan 2020 21:00)  HR: 88 (03 Jan 2020 05:28) (76 - 94)  BP: 127/51 (03 Jan 2020 05:28) (89/31 - 130/42)  BP(mean): --  RR: 18 (03 Jan 2020 05:28) (16 - 20)  SpO2: 94% (03 Jan 2020 05:28) (92% - 98%)    ________________________________________________  PHYSICAL EXAM:  GENERAL: NAD  CHEST/LUNG: Clear to auscultation bilaterally with good air entry   HEART: S1 S2  regular; no murmurs, gallops or rubs  ABDOMEN: Soft, Nontender, Nondistended; Bowel sounds present  EXTREMITIES: no cyanosis; no edema; no calf tenderness  SKIN: warm and dry; no rash  NERVOUS SYSTEM:  Awake and alert; Oriented  to place, person and time ; no new deficits  MUSCULOSKELETAL: + right hip tenderness, + decreased rom   _________________________________________________  LABS:                        9.4    8.00  )-----------( x        ( 03 Jan 2020 07:14 )             30.0     01-03    138  |  106  |  12  ----------------------------<  105<H>  3.9   |  27  |  0.63    Ca    8.2<L>      03 Jan 2020 07:14          CAPILLARY BLOOD GLUCOSE            RADIOLOGY & ADDITIONAL TESTS:    Imaging Personally Reviewed:  YES/NO    Consultant(s) Notes Reviewed:   YES/ No    Care Discussed with Consultants :     Plan of care was discussed with patient and /or primary care giver; all questions and concerns were addressed and care was aligned with patient's wishes.

## 2020-01-03 NOTE — DISCHARGE NOTE PROVIDER - NSDCMRMEDTOKEN_GEN_ALL_CORE_FT
Aspirin Enteric Coated 81 mg oral delayed release tablet: 1 tab(s) orally once a day  atorvastatin 20 mg oral tablet: 1 tab(s) orally once a day  enoxaparin: 30 milligram(s) subcutaneous every 12 hours  ferrous sulfate 325 mg (65 mg elemental iron) oral tablet: 1 tab(s) orally 3 times a day  latanoprost 0.005% ophthalmic solution: 1 drop(s) to each affected eye once a day (in the evening)  losartan 100 mg oral tablet: 1 tab(s) orally once a day  oxyCODONE: 2.5 milligram(s) orally every 4 hours, As Needed for severe pain   senna oral tablet: 2 tab(s) orally once a day (at bedtime) acetaminophen: 975 milligram(s) orally every 8 hours, As Needed moderate pain  ascorbic acid 500 mg oral tablet: 1 tab(s) orally once a day  Aspirin Enteric Coated 81 mg oral delayed release tablet: 1 tab(s) orally once a day  atorvastatin 20 mg oral tablet: 1 tab(s) orally once a day  enoxaparin: 30 milligram(s) subcutaneous every 12 hours  ferrous sulfate 325 mg (65 mg elemental iron) oral tablet: 1 tab(s) orally 3 times a day  latanoprost 0.005% ophthalmic solution: 1 drop(s) to each affected eye once a day (in the evening)  losartan 100 mg oral tablet: 1 tab(s) orally once a day  magnesium hydroxide 8% oral suspension: 30 milliliter(s) orally once a day, As needed, Constipation  polyethylene glycol 3350 oral powder for reconstitution: 17 gram(s) orally once a day  senna oral tablet: 2 tab(s) orally once a day (at bedtime)  traMADol 50 mg oral tablet: 1 tab(s) orally every 6 hours, As Needed

## 2020-01-03 NOTE — DISCHARGE NOTE PROVIDER - NSDCCPTREATMENT_GEN_ALL_CORE_FT
PRINCIPAL PROCEDURE  Procedure: Intramedullary nailing of right femur  Findings and Treatment: Pain Management- See Attached Medication Reconciliation  Weight Bearing Status: WBAT to RLE with walker   Equipment needs: Commode, Walker  Dressing: Please keep bandage/dressing Clean, Dry, and Intact.  Incision Site: REMOVE STAPLES on 01/16/20  STAPLES AND DRESSING TO BE REMOVED BY NURSE  NURSE TO APPLY STERI-STRIPS TO THE WOUND AFTER STAPLE REMOVAL   Dvt prophylaxis: D/C LOVENOX on 01/16/20  PT/Occupational Therapy are Activities of Daily Living as appropriate  Follow up with Orthopedic Surgeon, Dr. Gunter  after STAPLES ARE REMOVED in TWO WEEKS at: [phone] PRINCIPAL PROCEDURE  Procedure: Intramedullary nailing of right femur  Findings and Treatment: Pain Management- See Attached Medication Reconciliation  Weight Bearing Status: WBAT to RLE with walker   Equipment needs: Commode, Walker  Dressing: Please keep bandage/dressing Clean, Dry, and Intact.  Incision Site: REMOVE STAPLES on 01/16/20  STAPLES AND DRESSING TO BE REMOVED BY NURSE  NURSE TO APPLY STERI-STRIPS TO THE WOUND AFTER STAPLE REMOVAL   Dvt prophylaxis: D/C LOVENOX on 01/16/20  PT/Occupational Therapy are Activities of Daily Living as appropriate  Follow up with Orthopedic Surgeon, Dr. Gunter  after STAPLES ARE REMOVED in TWO WEEKS at: [phone]

## 2020-01-03 NOTE — PROGRESS NOTE ADULT - SUBJECTIVE AND OBJECTIVE BOX
PGY3 Note discussed with supervising resident and primary attending.    Patient is a 88y old  Female who presents with a chief complaint of Rt Hip Fracture (02 Jan 2020 10:12)      INTERVAL HPI/OVERNIGHT EVENTS: Pt was seen and examined at bedside. POD#2, s/p 1PRBC transfusion last night, Hb improved to 9.4 this am.     MEDICATIONS  (STANDING):  acetaminophen  IVPB .. 1000 milliGRAM(s) IV Intermittent once  acetaminophen  IVPB .. 1000 milliGRAM(s) IV Intermittent once  aspirin enteric coated 81 milliGRAM(s) Oral daily  atorvastatin 20 milliGRAM(s) Oral at bedtime  enoxaparin Injectable 30 milliGRAM(s) SubCutaneous every 12 hours  latanoprost 0.005% Ophthalmic Solution 1 Drop(s) Both EYES at bedtime  losartan 100 milliGRAM(s) Oral daily  polyethylene glycol 3350 17 Gram(s) Oral daily  senna 2 Tablet(s) Oral at bedtime  sodium chloride 0.9%. 1000 milliLiter(s) (110 mL/Hr) IV Continuous <Continuous>    MEDICATIONS  (PRN):  magnesium hydroxide Suspension 30 milliLiter(s) Oral daily PRN Constipation  ondansetron Injectable 4 milliGRAM(s) IV Push every 6 hours PRN Nausea and/or Vomiting  oxyCODONE    IR 2.5 milliGRAM(s) Oral every 4 hours PRN Severe Pain (7 - 10)      Allergies    penicillin (Unknown)    Intolerances        REVIEW OF SYSTEMS:  negative except above.    Vital Signs Last 24 Hrs  T(C): 37.1 (03 Jan 2020 05:28), Max: 37.5 (02 Jan 2020 21:00)  T(F): 98.8 (03 Jan 2020 05:28), Max: 99.5 (02 Jan 2020 21:00)  HR: 88 (03 Jan 2020 05:28) (76 - 96)  BP: 127/51 (03 Jan 2020 05:28) (89/31 - 130/42)  BP(mean): --  RR: 18 (03 Jan 2020 05:28) (16 - 20)  SpO2: 94% (03 Jan 2020 05:28) (92% - 98%)    PHYSICAL EXAM:  GENERAL: NAD  CHEST/LUNG: Clear to percussion bilaterally; No rales, rhonchi, wheezing, or rubs  HEART: Regular rate and rhythm; No murmurs, rubs, or gallops  ABDOMEN: Soft, Nontender, Nondistended; Bowel sounds present  NERVOUS SYSTEM:  Alert & Oriented X3, Good concentration  EXTREMITIES: Right Hip Dressing is C/D/I. Skin is pink and warm.  No erythema  SKIN; no rash     LABS:                        8.5    9.27  )-----------( 116      ( 02 Jan 2020 07:17 )             27.6     01-02    137  |  104  |  13  ----------------------------<  134<H>  4.1   |  27  |  0.62    Ca    8.0<L>      02 Jan 2020 07:17    TPro  7.1  /  Alb  4.1  /  TBili  0.6  /  DBili  x   /  AST  34  /  ALT  30  /  AlkPhos  98  12-31    PT/INR - ( 31 Dec 2019 19:35 )   PT: 11.5 sec;   INR: 1.03 ratio         PTT - ( 31 Dec 2019 19:35 )  PTT:28.4 sec    CAPILLARY BLOOD GLUCOSE          RADIOLOGY & ADDITIONAL TESTS:    Imaging Personally Reviewed:  [ ] YES  [ ] NO    Consultant(s) Notes Reviewed:  [ ] YES  [ ] NO

## 2020-01-03 NOTE — DISCHARGE NOTE NURSING/CASE MANAGEMENT/SOCIAL WORK - PATIENT PORTAL LINK FT
You can access the FollowMyHealth Patient Portal offered by [facility] by registering at the following website: [email or web address]. By joining [facility]’s FollowMyHealth portal, you will also be able to view your health information using other applications (apps) compatible with our system.

## 2020-01-03 NOTE — PROGRESS NOTE ADULT - ASSESSMENT
88 y.o female with PMHX of AMOS, thalassemia anemia,  HTN, CAD (PCI x1 stent, no blood thinners) , RA and Raynaud's disease and PSHx of Cataract surgery presents to the ED with a Rt hip pain s/p mechanical fall. Found has Rt intertrochanteric fracture of Hip, s/p right IM nailing, pod#1.     Assessment and Plan:   -Anemia, likely due to acute blood loss after surgery, also pt has hx of AMOS and thalassemia anemia  -Rt hip fracture      Plan:    -f/u repeat CBC and will need transfusion if Hb <8.0 (given hx of CAD), S/P 1PRBC transfusion last night, Hb improved to 9.4 this am  -f/u anemia panel   -recommend to start PO iron   -trend CBC q 8-12hrs   -pain management   -continue current care as per surgery team 88 y.o female with PMHX of AMOS, thalassemia anemia,  HTN, CAD (PCI x1 stent, no blood thinners) , RA and Raynaud's disease and PSHx of Cataract surgery presents to the ED with a Rt hip pain s/p mechanical fall. Found has Rt intertrochanteric fracture of Hip, s/p right IM nailing, pod#1.     Assessment and plan  -Rt hip fracture s/p r IM nailing Post Op Day 2  -Anemia, likely due to acute blood loss after surgery, also pt has hx of AMOS and thalassemia anemia  -Constipation opioid induced  -CAD s/p stent    Plan:    - Monitor H/H;  goal hb >8 given hx of CAD,  F/u anemia work up   - S/P 1PRBC transfusion last night, Hb improved to 9.4 this am  - Recommend start iron supplementation  - pain control  - c/w  Bowel regimen  - DVT ppx 88 y.o female with PMHX of AMOS, thalassemia anemia,  HTN, CAD (PCI x1 stent, no blood thinners) , RA and Raynaud's disease and PSHx of Cataract surgery presents to the ED with a Rt hip pain s/p mechanical fall. Found has Rt intertrochanteric fracture of Hip, s/p right IM nailing, pod#1.     Assessment and plan  -Rt hip fracture s/p r IM nailing Post Op Day 2  -Anemia, likely due to acute blood loss after surgery, also pt has hx of AMOS and thalassemia anemia  -Constipation opioid induced  -CAD s/p stent    Plan:    - Monitor H/H;  goal hb >8 given hx of CAD,  F/u anemia work up   - S/P 1PRBC transfusion last night, Hb improved to 9.4 this am  - c/w iron supplementation  - Will check CXR before discharge   - pain control and c/w  Bowel regimen  - DVT ppx 88 y.o female with PMHX of AMOS, thalassemia anemia,  HTN, CAD (PCI x1 stent, no blood thinners) , RA and Raynaud's disease and PSHx of Cataract surgery presents to the ED with a Rt hip pain s/p mechanical fall. Found has Rt intertrochanteric fracture of Hip, s/p right IM nailing, pod#1.     Assessment and plan  -Rt hip fracture s/p r IM nailing Post Op Day 2  -Anemia, likely due to acute blood loss after surgery, also pt has hx of AMOS and thalassemia anemia  -Constipation opioid induced  -CAD s/p stent    Plan:    - Monitor H/H;  goal hb >8 given hx of CAD,  F/u anemia work up   - S/P 1PRBC transfusion last night, Hb improved to 9.4 this am  - c/w iron supplementation  - repeated CXR this am no acute changes and pulse oxygen 96% on room air   - pain control and c/w  Bowel regimen  - DVT ppx

## 2020-01-03 NOTE — DISCHARGE NOTE PROVIDER - NSDCCPCAREPLAN_GEN_ALL_CORE_FT
PRINCIPAL DISCHARGE DIAGNOSIS  Diagnosis: Closed fracture of right hip, initial encounter  Assessment and Plan of Treatment: PRINCIPAL DISCHARGE DIAGNOSIS  Diagnosis: Closed fracture of right hip, initial encounter  Assessment and Plan of Treatment:       SECONDARY DISCHARGE DIAGNOSES  Diagnosis: Anemia  Assessment and Plan of Treatment: Anemia, likely due to acute blood loss after surgery, also pt has hx of AMOS and thalassemia anemia. Patient recieved 1PRBC transfusion on 1/2/19 Hemoglobin improved to 9.4 g/dL today. recommend to continue iron supplementation. pain control and continue with  Bowel regimen while on opioid medications. PRINCIPAL DISCHARGE DIAGNOSIS  Diagnosis: Closed fracture of right hip, initial encounter  Assessment and Plan of Treatment:       SECONDARY DISCHARGE DIAGNOSES  Diagnosis: Acute blood loss anemia  Assessment and Plan of Treatment: Anemia, likely due to acute blood loss after surgery, also pt has hx of AMOS and thalassemia anemia. Patient recieved 1PRBC transfusion on 1/2/19 Hemoglobin improved to 9.4 g/dL today. recommend to continue iron supplementation. pain control and continue with  Bowel regimen while on opioid medications.      Diagnosis: Hypertension  Assessment and Plan of Treatment: You actually had a slightly lower blood pressure which normalized. Your blood pressure medication was held during your admission. We advised that your medication of Losartan be resumed at a lower dose only if your blood pressure returns to a hypotensive state. Please hold off resuming the Losartan.    Diagnosis: CAD (coronary atherosclerotic disease)  Assessment and Plan of Treatment: You have a history of CAD. You may resume your normal medications except for your blood pressure medications as noted above

## 2021-09-14 NOTE — DISCHARGE NOTE PROVIDER - NSDCACTIVITY_GEN_ALL_CORE
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  He is ACC/AHA Stage C, NYHA Class II, euvolemic on exam. I have recommended the following:\par \par 1. HFpEF- Stable and compensated no longer on diuretics with HD. Labs as per HD. Continue Metoprolol 50mg daily. \par \par 2. Afib- He is on Amiodarone but Eliquis DCd as he was developing spontaneous Hematomas. He is now on Plavix and has an appt 10/6 to decide on Watchman procedure. \par \par 3. ESRD on HD- He is now on ESRD M/W/F since July. Followed by Dr. Guadalupe. 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 \par \par 5. HTN- Much better controlled with increase of Amlodipine. \par \par 6. Follow up in 6 months. 
Do not drive or operate machinery/No heavy lifting/straining

## 2022-06-16 NOTE — ED PROVIDER NOTE - OBJECTIVE STATEMENT
No
88 y.o female with no PMHX and no PSHx presents to the ED s/p standing on a stool in her house and falling off and landing on R hip  and unable to walk. Patient denies any head injury, LOC, neck pain, chest pain , nausea, vomiting, weakness or paralysis or any other acute complaints. Allergies : Penicillin

## 2024-02-05 NOTE — ED ADULT NURSE NOTE - CHPI ED NUR SYMPTOMS POS
Health Maintenance Due   Topic Date Due    Pneumococcal Vaccines (Age 0-64) (1 of 2 - PCV) Never done    Hemoglobin A1c (Diabetic Prevention Screening)  Never done    Shingles Vaccine (1 of 2) Never done    COVID-19 Vaccine (2023-24 season) 2023    RSV Vaccine (Age 60+ and Pregnant patients) (1 - 1-dose 60+ series) Never done     Population Health Chart Review & Patient Outreach Details    Updates Requested / Reviewed:     [x]  Care Everywhere    [x]     []  External Sources ([facility], [facility], [facility], etc.)    [] [facility]   [] [facility]   [] Other:    [x]  Care Team Updated   []  Removed  or Duplicate Orders   [x]  Immunization Reconciliation Completed / Queried    [x] [place]   [] [place]   [] [place]   [] [place]      Health Maintenance Topics Addressed and Outreach Outcomes / Actions Taken:             Breast Cancer Screening []  Mammogram Order Placed    []  Mammogram Screening Scheduled    []  External Records Requested & Care Team Updated if Applicable    []  External Records Uploaded & Care Team Updated if Applicable    []  Pt Declined Scheduling Mammogram    []  Pt Will Schedule with External Provider / Order Routed & Care Team Updated if Applicable              Cervical Cancer Screening []  Pap Smear Scheduled in Primary Care or OBGYN    []  External Records Requested & Care Team Updated if Applicable       []  External Records Uploaded, Care Team Updated, & History Updated if Applicable    []  Patient Declined Scheduling Pap Smear    []  Patient Will Schedule with External Provider & Care Team Updated if Applicable                  Colorectal Cancer Screening []  Colonoscopy Case Request / Referral / Home Test Order Placed    []  External Records Requested & Care Team Updated if Applicable    [x]  External Records Uploaded, Care Team Updated, & History Updated if Applicable    []  Patient Declined Completing Colon Cancer Screening    []  Patient Will Schedule with External Provider  & Care Team Updated if Applicable    []  Fit Kit Mailed (add the SmartPhrase under additional notes)    []  Reminded Patient to Complete Home Test                Diabetic Eye Exam []  Eye Exam Screening Order Placed    []  Eye Camera Scheduled or Optometry/Ophthalmology Referral Placed    []  External Records Requested & Care Team Updated if Applicable    []  External Records Uploaded, Care Team Updated, & History Updated if Applicable    []  Patient Declined Scheduling Eye Exam    []  Patient Will Schedule with External Provider & Care Team Updated if Applicable             Blood Pressure Control []  Primary Care Follow Up Visit Scheduled     []  Remote Blood Pressure Reading Captured    []  Patient Declined Remote Reading or Scheduling Appt - Escalated to PCP    []  Patient Will Call Back or Send Portal Message with Reading                 HbA1c & Other Labs []  Overdue Lab(s) Ordered    []  Overdue Lab(s) Scheduled    []  External Records Uploaded & Care Team Updated if Applicable    []  Primary Care Follow Up Visit Scheduled     []  Reminded Patient to Complete A1c Home Test    []  Patient Declined Scheduling Labs or Will Call Back to Schedule    []  Patient Will Schedule with External Provider / Order Routed, & Care Team Updated if Applicable           Primary Care Appointment []  Primary Care Appt Scheduled    []  Patient Declined Scheduling or Will Call Back to Schedule    []  Pt Established with External Provider, Updated Care Team, & Informed Pt to Notify Payor if Applicable           Medication Adherence /    Statin Use []  Primary Care Appointment Scheduled    []  Patient Reminded to  Prescription    []  Patient Declined, Provider Notified if Needed    []  Sent Provider Message to Review to Evaluate Pt for Statin, Add Exclusion Dx Codes, Document   Exclusion in Problem List, Change Statin Intensity Level to Moderate or High Intensity if Applicable                Osteoporosis Screening []  Dexa  Order Placed    []  Dexa Appointment Scheduled    []  External Records Requested & Care Team Updated    []  External Records Uploaded, Care Team Updated, & History Updated if Applicable    []  Patient Declined Scheduling Dexa or Will Call Back to Schedule    []  Patient Will Schedule with External Provider / Order Routed & Care Team Updated if Applicable       Additional Notes:    HM frequency set to repeat colonoscopy in 10 yrs per provider recommendation. Chart review completed.      WEAKNESS/PAIN/STIFFNESS/hip pain/TENDERNESS

## 2024-11-27 NOTE — ED ADULT NURSE NOTE - NURSING MUSC ROM
Restart nifedipine 30 mg daily.     Make hydralazine 25 mg every 8 hours as needed for blood pressure over 160 mmHg.    Dr. Ybarra [phone]  
hip pain s/p fall